# Patient Record
Sex: FEMALE | Race: WHITE | NOT HISPANIC OR LATINO | Employment: OTHER | URBAN - METROPOLITAN AREA
[De-identification: names, ages, dates, MRNs, and addresses within clinical notes are randomized per-mention and may not be internally consistent; named-entity substitution may affect disease eponyms.]

---

## 2023-11-27 ENCOUNTER — APPOINTMENT (OUTPATIENT)
Dept: GENERAL RADIOLOGY | Facility: HOSPITAL | Age: 83
End: 2023-11-27
Payer: MEDICARE

## 2023-11-27 ENCOUNTER — HOSPITAL ENCOUNTER (EMERGENCY)
Facility: HOSPITAL | Age: 83
Discharge: HOME | End: 2023-11-27
Attending: EMERGENCY MEDICINE | Admitting: EMERGENCY MEDICINE
Payer: MEDICARE

## 2023-11-27 VITALS
RESPIRATION RATE: 18 BRPM | OXYGEN SATURATION: 96 % | BODY MASS INDEX: 24.24 KG/M2 | TEMPERATURE: 98 F | HEIGHT: 64 IN | SYSTOLIC BLOOD PRESSURE: 133 MMHG | HEART RATE: 75 BPM | DIASTOLIC BLOOD PRESSURE: 70 MMHG | WEIGHT: 142 LBS

## 2023-11-27 DIAGNOSIS — R19.7 DIARRHEA, UNSPECIFIED TYPE: Primary | ICD-10-CM

## 2023-11-27 LAB
ALBUMIN SERPL-MCNC: 4.2 G/DL (ref 3.5–5.2)
ALBUMIN/GLOB SERPL: 1.8 G/DL
ALP SERPL-CCNC: 75 U/L (ref 39–117)
ALT SERPL W P-5'-P-CCNC: 16 U/L (ref 1–33)
ANION GAP SERPL CALCULATED.3IONS-SCNC: 10 MMOL/L (ref 5–15)
AST SERPL-CCNC: 18 U/L (ref 1–32)
BASOPHILS # BLD AUTO: 0.02 10*3/MM3 (ref 0–0.2)
BASOPHILS NFR BLD AUTO: 0.3 % (ref 0–1.5)
BILIRUB SERPL-MCNC: 0.5 MG/DL (ref 0–1.2)
BILIRUB UR QL STRIP: NEGATIVE
BUN SERPL-MCNC: 13 MG/DL (ref 8–23)
BUN/CREAT SERPL: 19.4 (ref 7–25)
CALCIUM SPEC-SCNC: 9.1 MG/DL (ref 8.6–10.5)
CHLORIDE SERPL-SCNC: 104 MMOL/L (ref 98–107)
CLARITY UR: CLEAR
CO2 SERPL-SCNC: 26 MMOL/L (ref 22–29)
COLOR UR: YELLOW
CREAT SERPL-MCNC: 0.67 MG/DL (ref 0.57–1)
DEPRECATED RDW RBC AUTO: 46.1 FL (ref 37–54)
EGFRCR SERPLBLD CKD-EPI 2021: 86.8 ML/MIN/1.73
EOSINOPHIL # BLD AUTO: 0.44 10*3/MM3 (ref 0–0.4)
EOSINOPHIL NFR BLD AUTO: 7.3 % (ref 0.3–6.2)
ERYTHROCYTE [DISTWIDTH] IN BLOOD BY AUTOMATED COUNT: 13.9 % (ref 12.3–15.4)
FERRITIN SERPL-MCNC: 159 NG/ML (ref 13–150)
GLOBULIN UR ELPH-MCNC: 2.4 GM/DL
GLUCOSE SERPL-MCNC: 102 MG/DL (ref 65–99)
GLUCOSE UR STRIP-MCNC: NEGATIVE MG/DL
HCT VFR BLD AUTO: 42.8 % (ref 34–46.6)
HGB BLD-MCNC: 13.4 G/DL (ref 12–15.9)
HGB UR QL STRIP.AUTO: NEGATIVE
IMM GRANULOCYTES # BLD AUTO: 0 10*3/MM3 (ref 0–0.05)
IMM GRANULOCYTES NFR BLD AUTO: 0 % (ref 0–0.5)
IRON 24H UR-MRATE: 98 MCG/DL (ref 37–145)
IRON SATN MFR SERPL: 23 % (ref 20–50)
KETONES UR QL STRIP: NEGATIVE
LEUKOCYTE ESTERASE UR QL STRIP.AUTO: NEGATIVE
LIPASE SERPL-CCNC: 33 U/L (ref 13–60)
LYMPHOCYTES # BLD AUTO: 1.1 10*3/MM3 (ref 0.7–3.1)
LYMPHOCYTES NFR BLD AUTO: 18.2 % (ref 19.6–45.3)
MAGNESIUM SERPL-MCNC: 2.2 MG/DL (ref 1.6–2.4)
MCH RBC QN AUTO: 27.9 PG (ref 26.6–33)
MCHC RBC AUTO-ENTMCNC: 31.3 G/DL (ref 31.5–35.7)
MCV RBC AUTO: 89.2 FL (ref 79–97)
MONOCYTES # BLD AUTO: 0.51 10*3/MM3 (ref 0.1–0.9)
MONOCYTES NFR BLD AUTO: 8.4 % (ref 5–12)
NEUTROPHILS NFR BLD AUTO: 3.99 10*3/MM3 (ref 1.7–7)
NEUTROPHILS NFR BLD AUTO: 65.8 % (ref 42.7–76)
NITRITE UR QL STRIP: NEGATIVE
PH UR STRIP.AUTO: 6.5 [PH] (ref 5–8)
PLATELET # BLD AUTO: 184 10*3/MM3 (ref 140–450)
PMV BLD AUTO: 12.3 FL (ref 6–12)
POTASSIUM SERPL-SCNC: 3.9 MMOL/L (ref 3.5–5.2)
PROT SERPL-MCNC: 6.6 G/DL (ref 6–8.5)
PROT UR QL STRIP: NEGATIVE
RBC # BLD AUTO: 4.8 10*6/MM3 (ref 3.77–5.28)
SODIUM SERPL-SCNC: 140 MMOL/L (ref 136–145)
SP GR UR STRIP: 1.01 (ref 1–1.03)
TIBC SERPL-MCNC: 434 MCG/DL (ref 298–536)
TRANSFERRIN SERPL-MCNC: 291 MG/DL (ref 200–360)
UROBILINOGEN UR QL STRIP: NORMAL
WBC NRBC COR # BLD AUTO: 6.06 10*3/MM3 (ref 3.4–10.8)

## 2023-11-27 PROCEDURE — 99283 EMERGENCY DEPT VISIT LOW MDM: CPT

## 2023-11-27 PROCEDURE — 83690 ASSAY OF LIPASE: CPT | Performed by: EMERGENCY MEDICINE

## 2023-11-27 PROCEDURE — 81003 URINALYSIS AUTO W/O SCOPE: CPT | Performed by: EMERGENCY MEDICINE

## 2023-11-27 PROCEDURE — 83540 ASSAY OF IRON: CPT | Performed by: EMERGENCY MEDICINE

## 2023-11-27 PROCEDURE — 84466 ASSAY OF TRANSFERRIN: CPT | Performed by: EMERGENCY MEDICINE

## 2023-11-27 PROCEDURE — 80053 COMPREHEN METABOLIC PANEL: CPT | Performed by: EMERGENCY MEDICINE

## 2023-11-27 PROCEDURE — 85025 COMPLETE CBC W/AUTO DIFF WBC: CPT | Performed by: EMERGENCY MEDICINE

## 2023-11-27 PROCEDURE — 83735 ASSAY OF MAGNESIUM: CPT | Performed by: EMERGENCY MEDICINE

## 2023-11-27 PROCEDURE — 36415 COLL VENOUS BLD VENIPUNCTURE: CPT

## 2023-11-27 PROCEDURE — 82728 ASSAY OF FERRITIN: CPT | Performed by: EMERGENCY MEDICINE

## 2023-11-27 PROCEDURE — 74022 RADEX COMPL AQT ABD SERIES: CPT

## 2023-11-27 RX ORDER — SODIUM CHLORIDE 0.9 % (FLUSH) 0.9 %
10 SYRINGE (ML) INJECTION AS NEEDED
Status: DISCONTINUED | OUTPATIENT
Start: 2023-11-27 | End: 2023-11-27 | Stop reason: HOSPADM

## 2023-11-27 RX ORDER — DICYCLOMINE HCL 20 MG
20 TABLET ORAL EVERY 6 HOURS PRN
Qty: 30 TABLET | Refills: 0 | Status: SHIPPED | OUTPATIENT
Start: 2023-11-27

## 2023-11-27 NOTE — FSED PROVIDER NOTE
Subjective   History of Present Illness  Patient is a 83-year-old female.  She recently moved from [place] to [place].  She presents with a several month history of recurrent diarrhea.  She states it does not appear to be related to food intake.  She denies any known bloody or tarry stools.  She denies any associated abdominal pain with this.  No nausea vomiting.  No weight loss.  No history of abdominal surgeries.  No dysuria or hematuria.      Review of Systems  Constitutional: No fevers, chills, sweats unless otherwise documented in HPI  Eyes: No recent visual problems, eye discharge, eye pain, redness unless otherwise documented in HPI  HEENT: No ear pain, nasal congestion, sore throat, voice changes unless otherwise documented in HPI  Respiratory: No shortness of breath, cough, pain on breathing, sputum production unless otherwise documented in HPI  Cardiovascular: No chest pain, palpitations, syncope, orthopnea unless otherwise documented in HPI  Gastrointestinal: No nausea, vomiting, diarrhea, constipation unless otherwise documented in HPI  Genitourinary: No hematuria, dysuria, incontinence unless otherwise documented in HPI  Endocrine: Negative for excessive thirst, excessive hunger, excessive urination, heat or cold intolerance unless otherwise documented in HPI  Musculoskeletal: No back pain, neck pain, joint pain, muscle pain, decreased range of motion unless otherwise documented in HPI  Integumentary: No rash, pruritus, abrasion, lesions unless otherwise documented in HPI  Neurologic: No weakness, numbness, frequent headaches, tremors unless otherwise documented in HPI  Psychiatric: No anxiety, depression, mood changes, hallucinations unless otherwise documented in HPI        History reviewed. No pertinent past medical history.    Allergies   Allergen Reactions    Iodine Hives    Succinylcholine Unknown - High Severity       Past Surgical History:   Procedure Laterality Date    HYSTERECTOMY  1976        Family History   Problem Relation Age of Onset    Ovarian cancer Neg Hx     Breast cancer Neg Hx        Social History     Socioeconomic History    Marital status:            Objective   Physical Exam  Vital signs: Reviewed in nurses notes    General: Patient is awake alert no acute distress    HEENT: Normocephalic atraumatic nasopharynx clear face clear moist    Neck:   Supple without lymphadenopathy    Respiratory:   Nonlabored respirations.  Clear to auscultation bilaterally.  Equal breath sounds bilaterally.  No wheezes or stridor noted.    Cardiovascular: Regular rate and rhythm.  No murmur.  Equal pulses in bilateral lower extremities without edema.    Abdomen: Soft, nontender to palpation.  Normal bowel sounds noted.  No masses noted.    Skin:   Warm and dry.  No rashes noted    Neurological examination: Patient is awake alert oriented x4.  Speech is normal.  No facial palsy.  No focal motor or sensory deficits.    Procedures           ED Course           Lab Results (last 72 hours)       Procedure Component Value Units Date/Time    Lipase [[id number]]  (Normal) Collected: 11/27/23 1119    Specimen: Blood Updated: 11/27/23 1144     Lipase 33 U/L     Comprehensive Metabolic Panel [[id number]]  (Abnormal) Collected: 11/27/23 1119    Specimen: Blood Updated: 11/27/23 1144     Glucose 102 mg/dL      BUN 13 mg/dL      Creatinine 0.67 mg/dL      Sodium 140 mmol/L      Potassium 3.9 mmol/L      Chloride 104 mmol/L      CO2 26.0 mmol/L      Calcium 9.1 mg/dL      Total Protein 6.6 g/dL      Albumin 4.2 g/dL      ALT (SGPT) 16 U/L      AST (SGOT) 18 U/L      Alkaline Phosphatase 75 U/L      Total Bilirubin 0.5 mg/dL      Globulin 2.4 gm/dL      A/G Ratio 1.8 g/dL      BUN/Creatinine Ratio 19.4     Anion Gap 10.0 mmol/L      eGFR 86.8 mL/min/1.73     Narrative:      GFR Normal >60  Chronic Kidney Disease <60  Kidney Failure <15    The GFR formula is only valid for adults with stable renal function between ages  18 and 70.    Magnesium [[id number]]  (Normal) Collected: 11/27/23 1119    Specimen: Blood Updated: 11/27/23 1144     Magnesium 2.2 mg/dL     CBC & Differential [[id number]]  (Abnormal) Collected: 11/27/23 1119    Specimen: Blood Updated: 11/27/23 1131    Narrative:      The following orders were created for panel order CBC & Differential.  Procedure                               Abnormality         Status                     ---------                               -----------         ------                     CBC Auto Differential[[id number]]         Abnormal            Final result                 Please view results for these tests on the individual orders.    Urinalysis With Culture If Indicated - Urine, Clean Catch [[id number]]  (Normal) Collected: 11/27/23 1119    Specimen: Urine, Clean Catch Updated: 11/27/23 1122     Color, UA Yellow     Appearance, UA Clear     pH, UA 6.5     Specific Gravity, UA 1.010     Glucose, UA Negative     Ketones, UA Negative     Bilirubin, UA Negative     Blood, UA Negative     Protein, UA Negative     Leuk Esterase, UA Negative     Nitrite, UA Negative     Urobilinogen, UA 0.2 E.U./dL    Narrative:      In absence of clinical symptoms, the presence of pyuria, bacteria, and/or nitrites on the urinalysis result does not correlate with infection.  Urine microscopic not indicated.    Iron Profile [[id number]] Collected: 11/27/23 1119    Specimen: Blood Updated: 11/27/23 1119    Ferritin [[id number]] Collected: 11/27/23 1119    Specimen: Blood Updated: 11/27/23 1119    CBC Auto Differential [[id number]]  (Abnormal) Collected: 11/27/23 1119    Specimen: Blood Updated: 11/27/23 1131     WBC 6.06 10*3/mm3      RBC 4.80 10*6/mm3      Hemoglobin 13.4 g/dL      Hematocrit 42.8 %      MCV 89.2 fL      MCH 27.9 pg      MCHC 31.3 g/dL      RDW 13.9 %      RDW-SD 46.1 fl      MPV 12.3 fL      Platelets 184 10*3/mm3      Neutrophil % 65.8 %      Lymphocyte % 18.2 %      Monocyte % 8.4 %      Eosinophil % 7.3  %      Basophil % 0.3 %      Immature Grans % 0.0 %      Neutrophils, Absolute 3.99 10*3/mm3      Lymphocytes, Absolute 1.10 10*3/mm3      Monocytes, Absolute 0.51 10*3/mm3      Eosinophils, Absolute 0.44 10*3/mm3      Basophils, Absolute 0.02 10*3/mm3      Immature Grans, Absolute 0.00 10*3/mm3                                XR Abdomen 2+ VW with Chest 1 VW    Result Date: 11/27/2023  ACUTE ABDOMEN SERIES: 2 VIEWS OF THE ABDOMEN AND 1 VIEW OF THE CHEST  HISTORY: Recurrent diarrhea for several months.  COMPARISON: None  FINDINGS: FLAT AND UPRIGHT ABDOMEN: There are no air-filled, dilated bowel loops to suggest obstruction. Cholecystectomy clips are present. There is a mild levoscoliotic curvature lumbar spine. Left total hip arthroplasty is present. Bones appear osteopenic.  AP CHEST: Heart size upper normal. Aortic vascular calcifications are present. Lungs appear clear and there is no evidence for pulmonary edema or pleural effusion.      Nonobstructive bowel gas pattern. Previous cholecystectomy and left total hip arthroplasty.  This report was finalized on 11/27/2023 11:55 AM by Dr. Malick Jimenez M.D on Workstation: [id number]            Differential diagnosis includes electrode abnormality, IBS, inflammatory bowel disease  Medical Decision Making  Problems Addressed:  Diarrhea, unspecified type: complicated acute illness or injury    Amount and/or Complexity of Data Reviewed  Labs: ordered.  Radiology: ordered.    Risk  Prescription drug management.    The x-rays were independently reviewed as well as review of the radiologist interpretation  Upon discharge patient noted to be very stable.  Appropriate treatment plan and return precautions discussed    Final diagnoses:   Diarrhea, unspecified type       ED Disposition  ED Disposition       ED Disposition   Discharge    Condition   Stable    Comment   --               Vasyl Fernandez MD  [address]  [id number] [phone]          Vasyl Fernandez MD  [address]  [place] [id number] [phone]               Medication List        New Prescriptions      dicyclomine 20 MG tablet  Commonly known as: BENTYL  Take 1 tablet by mouth Every 6 (Six) Hours As Needed (abdominal pain and /or diarrhea).               Where to Get Your Medications        These medications were sent to [facility]/pharmacy #[id number] - [place], [place] - [address] AT [facility] - [phone]  - [phone]   [address], [facility] [address]      Phone: [phone]   dicyclomine 20 MG tablet

## 2023-11-27 NOTE — DISCHARGE INSTRUCTIONS
Today your lab work is reassuring.  Your abdominal x-ray does not show any obstructive process.    I did send in a medication that is very safe to be used to slow your bowel motility.  Use as directed    I did send an internal referral to our gastroenterologist on-call.  I likewise did give you his name and number if you do not hear from the office within the next 48 hours.    I also am going to give you the number to the [facility] connection line which can help set up the primary care if you do not have any luck with the referral sheet given to you today    I will be here this Thursday night from 7 PM all night at [phone] if you discover that you need a refill on your antihypertensive medication    Return for any difficulties

## 2024-01-03 ENCOUNTER — APPOINTMENT (OUTPATIENT)
Dept: GENERAL RADIOLOGY | Facility: HOSPITAL | Age: 84
End: 2024-01-03
Payer: MEDICARE

## 2024-01-03 ENCOUNTER — HOSPITAL ENCOUNTER (OUTPATIENT)
Facility: HOSPITAL | Age: 84
Discharge: HOME | End: 2024-01-03
Attending: EMERGENCY MEDICINE | Admitting: EMERGENCY MEDICINE
Payer: MEDICARE

## 2024-01-03 VITALS
OXYGEN SATURATION: 96 % | DIASTOLIC BLOOD PRESSURE: 58 MMHG | TEMPERATURE: 98.4 F | SYSTOLIC BLOOD PRESSURE: 124 MMHG | WEIGHT: 144 LBS | HEIGHT: 64 IN | HEART RATE: 75 BPM | RESPIRATION RATE: 16 BRPM | BODY MASS INDEX: 24.59 KG/M2

## 2024-01-03 DIAGNOSIS — J06.9 UPPER RESPIRATORY TRACT INFECTION, UNSPECIFIED TYPE: Primary | ICD-10-CM

## 2024-01-03 LAB
FLUAV SUBTYP SPEC NAA+PROBE: NOT DETECTED
FLUBV RNA ISLT QL NAA+PROBE: NOT DETECTED
RSV RNA NPH QL NAA+NON-PROBE: NOT DETECTED
SARS-COV-2 RNA RESP QL NAA+PROBE: NOT DETECTED

## 2024-01-03 PROCEDURE — 87636 SARSCOV2 & INF A&B AMP PRB: CPT

## 2024-01-03 PROCEDURE — G0463 HOSPITAL OUTPT CLINIC VISIT: HCPCS | Performed by: NURSE PRACTITIONER

## 2024-01-03 PROCEDURE — 71045 X-RAY EXAM CHEST 1 VIEW: CPT

## 2024-01-03 PROCEDURE — 87634 RSV DNA/RNA AMP PROBE: CPT

## 2024-01-03 RX ORDER — BROMPHENIRAMINE MALEATE, PSEUDOEPHEDRINE HYDROCHLORIDE, AND DEXTROMETHORPHAN HYDROBROMIDE 2; 30; 10 MG/5ML; MG/5ML; MG/5ML
5 SYRUP ORAL 4 TIMES DAILY PRN
Qty: 120 ML | Refills: 0 | Status: SHIPPED | OUTPATIENT
Start: 2024-01-03

## 2024-01-03 NOTE — FSED PROVIDER NOTE
Subjective   History of Present Illness  Patient is an 83-year-old female who presents complaining of cough that started over the weekend.  Reports she is also been congestion and has had fatigue and chills.  She denies chest pain, shortness of breath.  Denies any vomiting or diarrhea.      Review of Systems   Constitutional:  Positive for chills and fatigue.   HENT:  Positive for congestion.    Respiratory:  Positive for cough.    All other systems reviewed and are negative.      History reviewed. No pertinent past medical history.    Allergies   Allergen Reactions    Iodine Hives    Succinylcholine Unknown - High Severity       Past Surgical History:   Procedure Laterality Date    HYSTERECTOMY  1976       Family History   Problem Relation Age of Onset    Ovarian cancer Neg Hx     Breast cancer Neg Hx        Social History     Socioeconomic History    Marital status:            Objective   Physical Exam  Vitals and nursing note reviewed.   Constitutional:       Appearance: Normal appearance.   HENT:      Head: Normocephalic and atraumatic.      Nose: Congestion present.   Pulmonary:      Effort: Pulmonary effort is normal.      Breath sounds: Normal breath sounds.   Musculoskeletal:      Cervical back: Normal range of motion and neck supple.   Skin:     General: Skin is warm and dry.      Capillary Refill: Capillary refill takes less than 2 seconds.   Neurological:      General: No focal deficit present.      Mental Status: She is alert and oriented to person, place, and time.         Procedures           ED Course                                           Medical Decision Making  Negative chest x-ray, negative flu COVID.  Likely viral URI.  Patient is nontoxic and is follow-up with her primary care, she was given strict return precautions.    Problems Addressed:  Upper respiratory tract infection, unspecified type: complicated acute illness or injury    Amount and/or Complexity of Data Reviewed  Labs:  ordered.  Radiology: ordered.    Risk  Prescription drug management.        Final diagnoses:   Upper respiratory tract infection, unspecified type       ED Disposition  ED Disposition       ED Disposition   Discharge    Condition   Stable    Comment   --               Provider, No Known  [place] [address]  [phone]    Call   If symptoms worsen         Medication List        New Prescriptions      brompheniramine-pseudoephedrine-DM 30-2-10 MG/5ML syrup  Take 5 mL by mouth 4 (Four) Times a Day As Needed for Cough or Congestion.               Where to Get Your Medications        These medications were sent to [facility]/pharmacy #[id number] - [place], [place] - [address] AT [facility] - [phone]  - [phone] fx [address], [facility] [address]      Phone: [phone]   brompheniramine-pseudoephedrine-DM 30-2-10 MG/5ML syrup

## 2024-02-13 ENCOUNTER — OFFICE VISIT (OUTPATIENT)
Dept: FAMILY MEDICINE CLINIC | Facility: CLINIC | Age: 84
End: 2024-02-13
Payer: MEDICARE

## 2024-02-13 VITALS
WEIGHT: 152.5 LBS | BODY MASS INDEX: 26.03 KG/M2 | RESPIRATION RATE: 16 BRPM | OXYGEN SATURATION: 95 % | HEART RATE: 64 BPM | SYSTOLIC BLOOD PRESSURE: 126 MMHG | DIASTOLIC BLOOD PRESSURE: 52 MMHG | TEMPERATURE: 97.6 F | HEIGHT: 64 IN

## 2024-02-13 DIAGNOSIS — I10 PRIMARY HYPERTENSION: Primary | ICD-10-CM

## 2024-02-13 PROBLEM — J40 BRONCHITIS: Status: ACTIVE | Noted: 2024-02-13

## 2024-02-13 PROBLEM — M19.90 ARTHRITIS: Status: ACTIVE | Noted: 2024-02-13

## 2024-02-13 PROCEDURE — 3074F SYST BP LT 130 MM HG: CPT | Performed by: INTERNAL MEDICINE

## 2024-02-13 PROCEDURE — 1159F MED LIST DOCD IN RCRD: CPT | Performed by: INTERNAL MEDICINE

## 2024-02-13 PROCEDURE — 1160F RVW MEDS BY RX/DR IN RCRD: CPT | Performed by: INTERNAL MEDICINE

## 2024-02-13 PROCEDURE — 99203 OFFICE O/P NEW LOW 30 MIN: CPT | Performed by: INTERNAL MEDICINE

## 2024-02-13 PROCEDURE — 3078F DIAST BP <80 MM HG: CPT | Performed by: INTERNAL MEDICINE

## 2024-02-13 RX ORDER — VITAMIN E 268 MG
800 CAPSULE ORAL DAILY
COMMUNITY

## 2024-02-13 RX ORDER — BISOPROLOL FUMARATE 10 MG/1
10 TABLET, FILM COATED ORAL DAILY
Qty: 30 TABLET | Refills: 2 | Status: SHIPPED | OUTPATIENT
Start: 2024-02-13

## 2024-02-13 RX ORDER — ASPIRIN 81 MG/1
81 TABLET ORAL DAILY
COMMUNITY

## 2024-02-13 RX ORDER — BISOPROLOL FUMARATE 10 MG/1
1 TABLET, FILM COATED ORAL DAILY
COMMUNITY
End: 2024-02-13 | Stop reason: SDUPTHER

## 2024-02-14 ENCOUNTER — PATIENT ROUNDING (BHMG ONLY) (OUTPATIENT)
Dept: FAMILY MEDICINE CLINIC | Facility: CLINIC | Age: 84
End: 2024-02-14
Payer: MEDICARE

## 2024-02-16 ENCOUNTER — OFFICE VISIT (OUTPATIENT)
Dept: GASTROENTEROLOGY | Facility: CLINIC | Age: 84
End: 2024-02-16
Payer: MEDICARE

## 2024-02-16 VITALS
WEIGHT: 153.6 LBS | DIASTOLIC BLOOD PRESSURE: 74 MMHG | BODY MASS INDEX: 26.22 KG/M2 | HEIGHT: 64 IN | OXYGEN SATURATION: 96 % | SYSTOLIC BLOOD PRESSURE: 129 MMHG | HEART RATE: 75 BPM

## 2024-02-16 DIAGNOSIS — R68.89 OTHER GENERAL SYMPTOMS AND SIGNS: ICD-10-CM

## 2024-02-16 DIAGNOSIS — K52.9 CHRONIC DIARRHEA: ICD-10-CM

## 2024-02-16 DIAGNOSIS — R10.31 RIGHT LOWER QUADRANT PAIN: Primary | ICD-10-CM

## 2024-02-16 RX ORDER — L.RHAMNOSUS/B.ANIMALIS(LACTIS) 3B CELL
1 CAPSULE ORAL DAILY
Start: 2024-02-16

## 2024-02-19 DIAGNOSIS — R10.31 RIGHT LOWER QUADRANT PAIN: Primary | ICD-10-CM

## 2024-02-19 DIAGNOSIS — K52.9 CHRONIC DIARRHEA: ICD-10-CM

## 2024-02-19 RX ORDER — PREDNISONE 1 MG/1
50 TABLET ORAL
OUTPATIENT
Start: 2024-02-19 | End: 2024-02-19

## 2024-02-19 RX ORDER — PREDNISONE 50 MG/1
50 TABLET ORAL TAKE AS DIRECTED
Qty: 2 TABLET | Refills: 0 | Status: SHIPPED | OUTPATIENT
Start: 2024-02-19

## 2024-02-19 RX ORDER — DIPHENHYDRAMINE HYDROCHLORIDE 50 MG/ML
50 INJECTION INTRAMUSCULAR; INTRAVENOUS
OUTPATIENT
Start: 2024-02-19 | End: 2024-02-19

## 2024-02-19 RX ORDER — DIPHENHYDRAMINE HCL 25 MG
50 TABLET ORAL
OUTPATIENT
Start: 2024-02-19 | End: 2024-02-19

## 2024-03-07 ENCOUNTER — LAB (OUTPATIENT)
Facility: HOSPITAL | Age: 84
End: 2024-03-07
Payer: MEDICARE

## 2024-03-07 ENCOUNTER — HOSPITAL ENCOUNTER (OUTPATIENT)
Facility: HOSPITAL | Age: 84
Discharge: HOME | End: 2024-03-07
Payer: MEDICARE

## 2024-03-07 DIAGNOSIS — K52.9 CHRONIC DIARRHEA: ICD-10-CM

## 2024-03-07 DIAGNOSIS — R10.31 RIGHT LOWER QUADRANT PAIN: ICD-10-CM

## 2024-03-07 LAB
CRP SERPL-MCNC: <0.3 MG/DL (ref 0–0.5)
ERYTHROCYTE [SEDIMENTATION RATE] IN BLOOD: 13 MM/HR (ref 0–30)
TSH SERPL DL<=0.05 MIU/L-ACNC: 0.58 UIU/ML (ref 0.27–4.2)

## 2024-03-07 PROCEDURE — 84443 ASSAY THYROID STIM HORMONE: CPT | Performed by: NURSE PRACTITIONER

## 2024-03-07 PROCEDURE — 86140 C-REACTIVE PROTEIN: CPT | Performed by: NURSE PRACTITIONER

## 2024-03-07 PROCEDURE — 0 DIATRIZOATE MEGLUMINE & SODIUM PER 1 ML: Performed by: NURSE PRACTITIONER

## 2024-03-07 PROCEDURE — 74177 CT ABD & PELVIS W/CONTRAST: CPT

## 2024-03-07 PROCEDURE — 85652 RBC SED RATE AUTOMATED: CPT | Performed by: NURSE PRACTITIONER

## 2024-03-07 PROCEDURE — 25510000001 IOPAMIDOL 61 % SOLUTION: Performed by: NURSE PRACTITIONER

## 2024-03-07 RX ADMIN — IOPAMIDOL 85 ML: 612 INJECTION, SOLUTION INTRAVENOUS at 14:21

## 2024-03-07 RX ADMIN — DIATRIZOATE MEGLUMINE AND DIATRIZOATE SODIUM 30 ML: 600; 100 SOLUTION ORAL; RECTAL at 12:23

## 2024-03-11 ENCOUNTER — TELEPHONE (OUTPATIENT)
Dept: GASTROENTEROLOGY | Facility: CLINIC | Age: 84
End: 2024-03-11
Payer: MEDICARE

## 2024-03-11 NOTE — TELEPHONE ENCOUNTER
Pt reviewed results via [employer].     Sent pt [employer] msg advising of results and recommendations. Advised to call if any questions.

## 2024-03-11 NOTE — TELEPHONE ENCOUNTER
----- Message from MARLENE Price sent at 3/8/2024  8:36 AM EST -----  Please notify the patient lab work is normal await imaging.

## 2024-03-15 ENCOUNTER — TELEPHONE (OUTPATIENT)
Dept: GASTROENTEROLOGY | Facility: CLINIC | Age: 84
End: 2024-03-15
Payer: MEDICARE

## 2024-03-15 NOTE — TELEPHONE ENCOUNTER
Call to patient per Aggie's results and recommendations.   Patient verbalized understanding     Patient states the probiotics have been beneficial. Patient states the pain on the right side. It happens randomly and she never knows when it is coming      ----- Message from MARLENE Price sent at 3/15/2024 11:38 AM EDT -----  Please inform the patient CT shows diverticulosis without evidence of diverticulitis.  Small areas on the liver suspected to be benign hemangiomas which are cluster of blood vessels.  She has a moderate amount of atherosclerotic changes on her abdomi  nal aorta.  She can follow-up with her primary care provider for that.  Has she found [employer] colon health probiotics beneficial?

## 2024-04-23 LAB
CRP SERPL-MCNC: <0.3 MG/DL (ref 0–0.5)
ERYTHROCYTE [SEDIMENTATION RATE] IN BLOOD BY WESTERGREN METHOD: 4 MM/HR (ref 0–30)
TSH SERPL DL<=0.005 MIU/L-ACNC: 1.41 UIU/ML (ref 0.27–4.2)

## 2024-04-25 ENCOUNTER — TELEPHONE (OUTPATIENT)
Dept: GASTROENTEROLOGY | Facility: CLINIC | Age: 84
End: 2024-04-25
Payer: MEDICARE

## 2024-04-25 NOTE — TELEPHONE ENCOUNTER
----- Message from MARLENE Price sent at 4/24/2024 12:16 PM EDT -----  Please inform the patient lab work is normal.

## 2024-04-25 NOTE — TELEPHONE ENCOUNTER
Pt reviewed results via [facility].     Sent pt [facility] msg advising of results and recommendations. Advised to call if any questions.

## 2024-04-30 ENCOUNTER — OFFICE VISIT (OUTPATIENT)
Dept: FAMILY MEDICINE CLINIC | Facility: CLINIC | Age: 84
End: 2024-04-30
Payer: MEDICARE

## 2024-04-30 VITALS
RESPIRATION RATE: 18 BRPM | OXYGEN SATURATION: 96 % | BODY MASS INDEX: 26.34 KG/M2 | WEIGHT: 154.3 LBS | HEIGHT: 64 IN | HEART RATE: 69 BPM | DIASTOLIC BLOOD PRESSURE: 66 MMHG | TEMPERATURE: 97.1 F | SYSTOLIC BLOOD PRESSURE: 122 MMHG

## 2024-04-30 DIAGNOSIS — N39.41 URGE INCONTINENCE: Primary | ICD-10-CM

## 2024-04-30 DIAGNOSIS — Z00.00 MEDICARE ANNUAL WELLNESS VISIT, SUBSEQUENT: ICD-10-CM

## 2024-04-30 DIAGNOSIS — G25.2 RESTING TREMOR: ICD-10-CM

## 2024-04-30 NOTE — PROGRESS NOTES
The ABCs of the Annual Wellness Visit  Subsequent Medicare Wellness Visit    Subjective    Yareli Fowler is a 83 y.o. female who presents for a Subsequent Medicare Wellness Visit.    The following portions of the patient's history were reviewed and   updated as appropriate: allergies, current medications, past family history, past medical history, past social history, past surgical history, and problem list.    Compared to one year ago, the patient feels her physical   health is better.    Compared to one year ago, the patient feels her mental   health is the same.    Recent Hospitalizations:  This patient has had a [facility] admission record on file within the last 365 days.    Current Medical Providers:  Patient Care Team:  Berenice Juarez MD as PCP - General (Internal Medicine)    Outpatient Medications Prior to Visit   Medication Sig Dispense Refill    aspirin 81 MG EC tablet Take 1 tablet by mouth Daily.      bisoprolol (ZEBeta) 10 MG tablet Take 1 tablet by mouth Daily. 30 tablet 2    predniSONE (DELTASONE) 50 MG tablet Take 1 tablet by mouth Take As Directed for 2 doses. Take 1 tablet (50mg) 13 Hours & 7 Hours Prior to Exam 2 tablet 0    Probiotic Product ([employer]) capsule capsule Take 1 capsule by mouth Daily.      vitamin D3 125 MCG (5000 UT) capsule capsule Take 1 capsule by mouth Daily.      VITAMIN E 400 UNIT capsule Take 2 capsules by mouth Daily.       No facility-administered medications prior to visit.       No opioid medication identified on active medication list. I have reviewed chart for other potential  high risk medication/s and harmful drug interactions in the elderly.        Aspirin is on active medication list. Aspirin use is indicated based on review of current medical condition/s. Pros and cons of this therapy have been discussed today. Benefits of this medication outweigh potential harm.  Patient has been encouraged to continue taking this medication.   ".      Patient Active Problem List   Diagnosis    Arthritis    Bronchitis    Hypertension     Advance Care Planning   Advance Care Planning     Advance Directive is not on file.  ACP discussion was held with the patient during this visit. Patient has an advance directive (not in EMR), copy requested.     Objective    Vitals:    24 1101   BP: 122/66   BP Location: Right arm   Patient Position: Sitting   Cuff Size: Adult   Pulse: 69   Resp: 18   Temp: 97.1 °F (36.2 °C)   TempSrc: Oral   SpO2: 96%   Weight: 70 kg (154 lb 4.8 oz)   Height: 162.6 cm (64.02\")     Estimated body mass index is 26.47 kg/m² as calculated from the following:    Height as of this encounter: 162.6 cm (64.02\").    Weight as of this encounter: 70 kg (154 lb 4.8 oz).    BMI is >= 25 and <30. (Overweight) The following options were offered after discussion;: exercise counseling/recommendations and nutrition counseling/recommendations      Does the patient have evidence of cognitive impairment? Yes          HEALTH RISK ASSESSMENT    Smoking Status:  Social History     Tobacco Use   Smoking Status Former    Current packs/day: 0.00    Average packs/day: 0.3 packs/day for 1 year (0.3 ttl pk-yrs)    Types: Cigarettes    Start date:     Quit date:     Years since quittin.3    Passive exposure: Past   Smokeless Tobacco Never     Alcohol Consumption:  Social History     Substance and Sexual Activity   Alcohol Use Yes    Comment: A glass of wine monthly     Fall Risk Screen:    SPEEDYADI Fall Risk Assessment was completed, and patient is at LOW risk for falls.Assessment completed on:2024    Depression Screenin/30/2024    10:59 AM   PHQ-2/PHQ-9 Depression Screening   Little Interest or Pleasure in Doing Things 0-->not at all   Feeling Down, Depressed or Hopeless 0-->not at all   PHQ-9: Brief Depression Severity Measure Score 0       Health Habits and Functional and Cognitive Screenin/30/2024    10:58 AM   Functional & " Cognitive Status   Do you have difficulty preparing food and eating? No   Do you have difficulty bathing yourself, getting dressed or grooming yourself? No   Do you have difficulty using the toilet? No   Do you have difficulty moving around from place to place? No   Do you have trouble with steps or getting out of a bed or a chair? No   Current Diet Well Balanced Diet   Dental Exam Not up to date   Eye Exam Not up to date   Exercise (times per week) 0 times per week   Current Exercises Include No Regular Exercise   Do you need help using the phone?  No   Are you deaf or do you have serious difficulty hearing?  No   Do you need help to go to places out of walking distance? No   Do you need help shopping? No   Do you need help preparing meals?  No   Do you need help with housework?  Yes   Do you need help with laundry? No   Do you need help taking your medications? No   Do you need help managing money? No   Do you ever drive or ride in a car without wearing a seat belt? No   Have you felt unusual stress, anger or loneliness in the last month? No   Who do you live with? Spouse   If you need help, do you have trouble finding someone available to you? No   Have you been bothered in the last four weeks by sexual problems? No   Do you have difficulty concentrating, remembering or making decisions? No       Age-appropriate Screening Schedule:  Refer to the list below for future screening recommendations based on patient's age, sex and/or medical conditions. Orders for these recommended tests are listed in the plan section. The patient has been provided with a written plan.    Health Maintenance   Topic Date Due    DXA SCAN  Never done    TDAP/TD VACCINES (1 - Tdap) Never done    ZOSTER VACCINE (1 of 2) Never done    RSV Vaccine - Adults (1 - 1-dose 60+ series) Never done    Pneumococcal Vaccine 65+ (1 of 1 - PCV) Never done    COVID-19 Vaccine (2 - 2023-24 season) 09/01/2023    INFLUENZA VACCINE  08/01/2024    ANNUAL  "WELLNESS VISIT  04/30/2025    BMI FOLLOWUP  04/30/2025                  CMS Preventative Services Quick Reference  Risk Factors Identified During Encounter  Urinary Incontinence: New medication begun   Dental Screening Recommended  Vision Screening Recommended  The above risks/problems have been discussed with the patient.  Pertinent information has been shared with the patient in the After Visit Summary.  An After Visit Summary and PPPS were made available to the patient.    Follow Up:   Next Medicare Wellness visit to be scheduled in 1 year.       Additional E&M Note during same encounter follows:  Patient has multiple medical problems which are significant and separately identifiable that require additional work above and beyond the Medicare Wellness Visit.      Chief Complaint  Medicare Wellness-subsequent    Subjective        HPI  Yareli Fowler is also being seen today for resting tremor and urge incontinence.  She reports that for the past couple of months she has been experiencing tremor in her right hand at rest occurring intermittently.  She reports family history of Parkinson's disease and Alzheimer's.  Denies memory loss, ataxia loss of balance.  Also reports urinary incontinence with urge to pee.         Objective   Vital Signs:  /66 (BP Location: Right arm, Patient Position: Sitting, Cuff Size: Adult)   Pulse 69   Temp 97.1 °F (36.2 °C) (Oral)   Resp 18   Ht 162.6 cm (64.02\")   Wt 70 kg (154 lb 4.8 oz)   SpO2 96%   BMI 26.47 kg/m²     Physical Exam  Constitutional:       Appearance: Normal appearance.   HENT:      Head: Normocephalic and atraumatic.   Cardiovascular:      Rate and Rhythm: Normal rate and regular rhythm.      Heart sounds: Normal heart sounds.   Pulmonary:      Breath sounds: Normal breath sounds.   Abdominal:      General: Bowel sounds are normal.      Palpations: Abdomen is soft.   Neurological:      Mental Status: She is alert and oriented to person, place, and time. "   Psychiatric:         Mood and Affect: Mood normal.          The following data was reviewed by: Berenice Juarez MD on 04/30/2024:  Common labs          11/27/2023    11:19   Common Labs   Glucose 102    BUN 13    Creatinine 0.67    Sodium 140    Potassium 3.9    Chloride 104    Calcium 9.1    Albumin 4.2    Total Bilirubin 0.5    Alkaline Phosphatase 75    AST (SGOT) 18    ALT (SGPT) 16    WBC 6.06    Hemoglobin 13.4    Hematocrit 42.8    Platelets 184                 Assessment and Plan   Diagnoses and all orders for this visit:    1. Urge incontinence (Primary)  -     Mirabegron ER (MYRBETRIQ) 25 MG tablet sustained-release 24 hour 24 hr tablet; Take 1 tablet by mouth Daily.  Dispense: 30 tablet; Refill: 0    2. Resting tremor  -     Ambulatory Referral to Neurology    3. Medicare annual wellness visit, subsequent  -     CBC Auto Differential  -     Comprehensive Metabolic Panel  -     Lipid Panel With / Chol / HDL Ratio  -     Hemoglobin A1c             Follow Up   No follow-ups on file.  Patient was given instructions and counseling regarding her condition or for health maintenance advice. Please see specific information pulled into the AVS if appropriate.

## 2024-05-08 LAB
ALBUMIN SERPL-MCNC: 4.7 G/DL (ref 3.5–5.2)
ALBUMIN/GLOB SERPL: 2.2 G/DL
ALP SERPL-CCNC: 64 U/L (ref 39–117)
ALT SERPL-CCNC: 13 U/L (ref 1–33)
AST SERPL-CCNC: 15 U/L (ref 1–32)
BASOPHILS # BLD AUTO: 0.04 10*3/MM3 (ref 0–0.2)
BASOPHILS NFR BLD AUTO: 0.7 % (ref 0–1.5)
BILIRUB SERPL-MCNC: 0.6 MG/DL (ref 0–1.2)
BUN SERPL-MCNC: 15 MG/DL (ref 8–23)
BUN/CREAT SERPL: 20.5 (ref 7–25)
CALCIUM SERPL-MCNC: 9.3 MG/DL (ref 8.6–10.5)
CHLORIDE SERPL-SCNC: 104 MMOL/L (ref 98–107)
CHOLEST SERPL-MCNC: 260 MG/DL (ref 0–200)
CHOLEST/HDLC SERPL: 4.33 {RATIO}
CO2 SERPL-SCNC: 26.3 MMOL/L (ref 22–29)
CREAT SERPL-MCNC: 0.73 MG/DL (ref 0.57–1)
EGFRCR SERPLBLD CKD-EPI 2021: 81.7 ML/MIN/1.73
EOSINOPHIL # BLD AUTO: 0.11 10*3/MM3 (ref 0–0.4)
EOSINOPHIL NFR BLD AUTO: 1.8 % (ref 0.3–6.2)
ERYTHROCYTE [DISTWIDTH] IN BLOOD BY AUTOMATED COUNT: 13 % (ref 12.3–15.4)
GLOBULIN SER CALC-MCNC: 2.1 GM/DL
GLUCOSE SERPL-MCNC: 95 MG/DL (ref 65–99)
HBA1C MFR BLD: 5.5 % (ref 4.8–5.6)
HCT VFR BLD AUTO: 43.4 % (ref 34–46.6)
HDLC SERPL-MCNC: 60 MG/DL (ref 40–60)
HGB BLD-MCNC: 14.1 G/DL (ref 12–15.9)
IMM GRANULOCYTES # BLD AUTO: 0.01 10*3/MM3 (ref 0–0.05)
IMM GRANULOCYTES NFR BLD AUTO: 0.2 % (ref 0–0.5)
LDLC SERPL CALC-MCNC: 177 MG/DL (ref 0–100)
LYMPHOCYTES # BLD AUTO: 1.06 10*3/MM3 (ref 0.7–3.1)
LYMPHOCYTES NFR BLD AUTO: 17.5 % (ref 19.6–45.3)
MCH RBC QN AUTO: 29.2 PG (ref 26.6–33)
MCHC RBC AUTO-ENTMCNC: 32.5 G/DL (ref 31.5–35.7)
MCV RBC AUTO: 89.9 FL (ref 79–97)
MONOCYTES # BLD AUTO: 0.51 10*3/MM3 (ref 0.1–0.9)
MONOCYTES NFR BLD AUTO: 8.4 % (ref 5–12)
NEUTROPHILS # BLD AUTO: 4.32 10*3/MM3 (ref 1.7–7)
NEUTROPHILS NFR BLD AUTO: 71.4 % (ref 42.7–76)
PLATELET # BLD AUTO: 175 10*3/MM3 (ref 140–450)
POTASSIUM SERPL-SCNC: 4.6 MMOL/L (ref 3.5–5.2)
PROT SERPL-MCNC: 6.8 G/DL (ref 6–8.5)
RBC # BLD AUTO: 4.83 10*6/MM3 (ref 3.77–5.28)
SODIUM SERPL-SCNC: 141 MMOL/L (ref 136–145)
TRIGL SERPL-MCNC: 127 MG/DL (ref 0–150)
VLDLC SERPL CALC-MCNC: 23 MG/DL (ref 5–40)
WBC # BLD AUTO: 6.19 10*3/MM3 (ref 3.4–10.8)

## 2024-05-10 ENCOUNTER — OFFICE VISIT (OUTPATIENT)
Dept: FAMILY MEDICINE CLINIC | Facility: CLINIC | Age: 84
End: 2024-05-10
Payer: MEDICARE

## 2024-05-10 VITALS
DIASTOLIC BLOOD PRESSURE: 60 MMHG | WEIGHT: 149.8 LBS | BODY MASS INDEX: 25.57 KG/M2 | SYSTOLIC BLOOD PRESSURE: 102 MMHG | OXYGEN SATURATION: 96 % | HEART RATE: 68 BPM | RESPIRATION RATE: 18 BRPM | HEIGHT: 64 IN | TEMPERATURE: 98.4 F

## 2024-05-10 DIAGNOSIS — Z13.820 SCREENING FOR OSTEOPOROSIS: ICD-10-CM

## 2024-05-10 DIAGNOSIS — E78.00 PURE HYPERCHOLESTEROLEMIA: Primary | ICD-10-CM

## 2024-05-10 DIAGNOSIS — Z23 NEED FOR PNEUMOCOCCAL VACCINE: ICD-10-CM

## 2024-05-10 DIAGNOSIS — M81.8 OTHER OSTEOPOROSIS WITHOUT CURRENT PATHOLOGICAL FRACTURE: ICD-10-CM

## 2024-05-10 PROBLEM — M13.812 ALLERGIC ARTHRITIS OF LEFT SHOULDER REGION: Status: ACTIVE | Noted: 2024-05-10

## 2024-05-10 PROBLEM — M19.90 ARTHRITIS: Status: ACTIVE | Noted: 2024-05-10

## 2024-05-10 PROBLEM — M19.90 ARTHRITIS: Status: RESOLVED | Noted: 2024-05-10 | Resolved: 2024-05-10

## 2024-05-10 PROCEDURE — 3078F DIAST BP <80 MM HG: CPT | Performed by: INTERNAL MEDICINE

## 2024-05-10 PROCEDURE — 99213 OFFICE O/P EST LOW 20 MIN: CPT | Performed by: INTERNAL MEDICINE

## 2024-05-10 PROCEDURE — G0009 ADMIN PNEUMOCOCCAL VACCINE: HCPCS | Performed by: INTERNAL MEDICINE

## 2024-05-10 PROCEDURE — 90677 PCV20 VACCINE IM: CPT | Performed by: INTERNAL MEDICINE

## 2024-05-10 PROCEDURE — 3074F SYST BP LT 130 MM HG: CPT | Performed by: INTERNAL MEDICINE

## 2024-05-10 RX ORDER — EZETIMIBE 10 MG/1
10 TABLET ORAL DAILY
Qty: 90 TABLET | Refills: 1 | Status: SHIPPED | OUTPATIENT
Start: 2024-05-10

## 2024-05-10 NOTE — ASSESSMENT & PLAN NOTE
Lipid abnormalities are worsening    Plan:  Continue same medication/s without change.   and Begin taking the following medication/s; Zetia 10 mg daily.    Counseled patient on lifestyle modifications to help control hyperlipidemia.   Cholesterol lowering dietary information shared with patient.  Advised patient to exercise for 150 minutes weekly. (30 minute brisk walk, 5 days a week for example)  Weight Loss encouraged    Patient Treatment Goals:   LDL goal is under 100    Followup in 3 months.

## 2024-05-10 NOTE — PROGRESS NOTES
Chief Complaint   Patient presents with    Follow-up     Lab follow up, myrbetriq to expensive    Hypertension   History of Present Illness:  Patient is here today for follow up on labs. Lab review indicate worsening lipid abnormalities with elevated Total/LDL cholesterol. She reports was previously on statin therapy but developed severe side effects due to muscle cramps and pain. She tried diet control but was not able to achieve target goal of LDL <100. The rest of her blood work are essentially normal. She reports mirabegron prescribed for her urge incontinence was too expensive for her to afford and has decided to go with conservative mgt with bladder training for now.      She is due for Dexa scan and Pneumococcal vaccine.     PMH:   Outpatient Medications Prior to Visit   Medication Sig Dispense Refill    aspirin 81 MG EC tablet Take 1 tablet by mouth Daily.      bisoprolol (ZEBeta) 10 MG tablet Take 1 tablet by mouth Daily. 30 tablet 2    Mirabegron ER (MYRBETRIQ) 25 MG tablet sustained-release 24 hour 24 hr tablet Take 1 tablet by mouth Daily. 30 tablet 0    predniSONE (DELTASONE) 50 MG tablet Take 1 tablet by mouth Take As Directed for 2 doses. Take 1 tablet (50mg) 13 Hours & 7 Hours Prior to Exam 2 tablet 0    Probiotic Product (LeukoDx) capsule capsule Take 1 capsule by mouth Daily.      vitamin D3 125 MCG (5000 UT) capsule capsule Take 1 capsule by mouth Daily.      VITAMIN E 400 UNIT capsule Take 2 capsules by mouth Daily.       No facility-administered medications prior to visit.      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    Succinylcholine Other (See Comments)     Paralysis     Past Surgical History:   Procedure Laterality Date    HYSTERECTOMY  1976    TOTAL HIP ARTHROPLASTY REVISION       family history is not on file.   reports that she quit smoking about  "63 years ago. Her smoking use included cigarettes. She started smoking about 64 years ago. She has a 0.3 pack-year smoking history. She has been exposed to tobacco smoke. She has never used smokeless tobacco. She reports current alcohol use. She reports that she does not use drugs.     /60 (BP Location: Right arm, Patient Position: Sitting, Cuff Size: Adult)   Pulse 68   Temp 98.4 °F (36.9 °C) (Oral)   Resp 18   Ht 162.6 cm (64.02\")   Wt 67.9 kg (149 lb 12.8 oz)   LMP  (LMP Unknown)   SpO2 96%   BMI 25.70 kg/m²   Physical Exam  Constitutional:       Appearance: Normal appearance.   HENT:      Head: Normocephalic and atraumatic.   Neurological:      Mental Status: She is alert and oriented to person, place, and time.   Psychiatric:         Mood and Affect: Mood normal.          The following data was reviewed by: Berenice Juarez MD on 05/10/2024:  Common labs          11/27/2023    11:19 5/7/2024    09:24   Common Labs   Glucose 102  95    BUN 13  15    Creatinine 0.67  0.73    Sodium 140  141    Potassium 3.9  4.6    Chloride 104  104    Calcium 9.1  9.3    Total Protein  6.8    Albumin 4.2  4.7    Total Bilirubin 0.5  0.6    Alkaline Phosphatase 75  64    AST (SGOT) 18  15    ALT (SGPT) 16  13    WBC 6.06  6.19    Hemoglobin 13.4  14.1    Hematocrit 42.8  43.4    Platelets 184  175    Total Cholesterol  260    Triglycerides  127    HDL Cholesterol  60    LDL Cholesterol   177    Hemoglobin A1C  5.50           Diagnoses and all orders for this visit:    1. Pure hypercholesterolemia (Primary)  -     ezetimibe (Zetia) 10 MG tablet; Take 1 tablet by mouth Daily.  Dispense: 90 tablet; Refill: 1  -     Lipid Panel With / Chol / HDL Ratio; Future    2. Screening for osteoporosis  -     DEXA Bone Density Axial    3. Other osteoporosis without current pathological fracture  -     DEXA Bone Density Axial    4. Need for pneumococcal vaccine  -     Pneumococcal Conjugate Vaccine 20-Valent (PCV20)     Encouraged " patient to get her RSV, shingles and Covid vaccine at her preferred pharmacy        Return in about 3 months (around 8/10/2024) for Recheck with repeat fasting labs.     Answers submitted by the patient for this visit:  Other (Submitted on 5/9/2024)  Please describe your symptoms.: Visit following lab results  Have you had these symptoms before?: Yes  How long have you been having these symptoms?: Greater than 2 weeks  Please list any medications you are currently taking for this condition.: Tylenol  Please describe any probable cause for these symptoms. : Not known  Primary Reason for Visit (Submitted on 5/9/2024)  What is the primary reason for your visit?: Other

## 2024-05-12 DIAGNOSIS — I10 PRIMARY HYPERTENSION: ICD-10-CM

## 2024-05-13 RX ORDER — BISOPROLOL FUMARATE 10 MG/1
10 TABLET, FILM COATED ORAL DAILY
Qty: 90 TABLET | Refills: 1 | Status: SHIPPED | OUTPATIENT
Start: 2024-05-13

## 2024-05-24 ENCOUNTER — OFFICE VISIT (OUTPATIENT)
Dept: NEUROLOGY | Facility: CLINIC | Age: 84
End: 2024-05-24
Payer: MEDICARE

## 2024-05-24 VITALS
DIASTOLIC BLOOD PRESSURE: 64 MMHG | BODY MASS INDEX: 25.7 KG/M2 | HEART RATE: 68 BPM | OXYGEN SATURATION: 95 % | HEIGHT: 64 IN | SYSTOLIC BLOOD PRESSURE: 112 MMHG

## 2024-05-24 DIAGNOSIS — R25.1 TREMOR: Primary | ICD-10-CM

## 2024-05-24 PROCEDURE — 1159F MED LIST DOCD IN RCRD: CPT | Performed by: PSYCHIATRY & NEUROLOGY

## 2024-05-24 PROCEDURE — 1160F RVW MEDS BY RX/DR IN RCRD: CPT | Performed by: PSYCHIATRY & NEUROLOGY

## 2024-05-24 PROCEDURE — 3074F SYST BP LT 130 MM HG: CPT | Performed by: PSYCHIATRY & NEUROLOGY

## 2024-05-24 PROCEDURE — 99204 OFFICE O/P NEW MOD 45 MIN: CPT | Performed by: PSYCHIATRY & NEUROLOGY

## 2024-05-24 PROCEDURE — 3078F DIAST BP <80 MM HG: CPT | Performed by: PSYCHIATRY & NEUROLOGY

## 2024-05-24 NOTE — LETTER
May 24, 2024       No Recipients    Patient: Yareli Fowler   YOB: 1940   Date of Visit: 5/24/2024     Dear Berenice Juarez MD:       Thank you for referring Yareli Fowler to me for evaluation. Below are the relevant portions of my assessment and plan of care.    If you have questions, please do not hesitate to call me. I look forward to following Yareli along with you.         Sincerely,        Kristy Mares MD        CC:   No Recipients    Kristy Mares MD  05/24/24 1152  Sign when Signing Visit  Chief Complaint  Tremors (Right hand/)    Subjective         Yareli Fowler presents to [facility] NEUROLOG[facility] for   HISTORY OF PRESENT ILLNESS:    History reviewed. No pertinent past medical history.     Yareli Fowler is a 83 year old right handed woman who presents to neurology clinic with her  Jose for initial evaluation and treatment of tremors.  She reports noticing the tremors in her signature and her writing over 10 years ago.  She reports having some tremor in her right hand at rest which is occasional and comes and goes but previously she noticed the tremors with action and doing things with her hands.  She has a hard time doing meticulous work with her hands and fine motor movements.  She has had carpal tunnel in her right hand for which she had carpal tunnel release procedure performed in 2022.  She reports living in [place] for about 10 years and recently moved back.  She had a brain MRI scan in [place] in 10/2021 which was read as demonstrating marked chronic small vessel ischemic disease with no focal or acute abnormality.  She has had recent CMP, HgbA1c, TSH, CRP and sed rate which are all normal.  She has significant arthritis and she has a hard time getting around due to significant degeneration in her knees.  She reports that her mother's half brother had some tremors.  She has not slowed down significantly with her gait.  Her voice has not changed  "over the past several years.  She has not had any recent falls.  No major issues with memory.  They are handling their own finances and affairs.  She does report her sense of smell is not as good as her husbands but still smells things fine.  She has rolled out of bed one time in [place], she does not have weird dreams and has not acted out her sleep as far as she knows.  She does not dream much at all.  She does not drink caffeine.  She does not smoke.  She might have a glass of wine once a month at most.      Family History   Problem Relation Age of Onset   • Ovarian cancer Neg Hx    • Breast cancer Neg Hx         Social History     Socioeconomic History   • Marital status:    Tobacco Use   • Smoking status: Former     Current packs/day: 0.00     Average packs/day: 0.3 packs/day for 1 year (0.3 ttl pk-yrs)     Types: Cigarettes     Start date:      Quit date:      Years since quittin.4     Passive exposure: Past   • Smokeless tobacco: Never   Vaping Use   • Vaping status: Never Used   Substance and Sexual Activity   • Alcohol use: Yes     Comment: A glass of wine monthly   • Drug use: Never   • Sexual activity: Defer        I have reviewed and confirmed the accuracy of the ROS as documented by the MA/LPN/RN Kristy Mares MD   Review of Systems   Neurological:  Positive for tremors (right hand) and weakness (joshua shoulder, arms and hands). Negative for dizziness, seizures, syncope, facial asymmetry, speech difficulty, light-headedness, numbness, headache, memory problem and confusion.        Objective  Vital Signs:   /64   Pulse 68   Ht 162.6 cm (64.02\")   SpO2 95%   BMI 25.70 kg/m²       PHYSICAL EXAM:    General   Mental Status - Alert. General Appearance - Well developed, Well groomed, Oriented and Cooperative. Orientation - Oriented X3.       Head and Neck  Head - normocephalic, atraumatic with no lesions or palpable masses.  Neck    Global Assessment - supple.       Eye "   Sclera/Conjunctiva - Bilateral - Normal.    ENMT  Mouth and Throat   Oral Cavity/Oropharynx: Oropharynx - the soft palate,uvula and tongue are normal in appearance.    Chest and Lung Exam   Chest - lung clear to auscultation bilaterally.    Cardiovascular   Cardiovascular examination reveals  - normal heart sounds, regular rate and rhythm.    Neurologic   Mental Status: Speech - Normal. Cognitive function - appropriate fund of knowledge. No impairment of attention, Impairment of concentration, impairment of long term memory or impairment of short term memory.  Cranial Nerves:   II Optic: Visual acuity - Left - Normal. Right - Normal. Visual fields - Normal (to confrontation).  III Oculomotor: Pupillary constriction - Left - Normal. Right - Normal.  VII Facial: - Normal Bilaterally.   IX Glossopharyngeal / X Vagus - Normal.  XI Accessory: Trapezius - Bilateral - Normal. Sternocleidomastoid - Bilateral - Normal.  XII Hypoglossal - Bilateral - Normal.  Eye Movements: - Normal Bilaterally.  Sensory:   Light Touch: Intact - Globally.  Motor:   Bulk and Contour: - Normal.  Tone: - Normal.  Tremor: Very mild fast frequency action tremor noted in right hand.   Strength: 5/5 normal muscle strength - All Muscles.   General Assessment of Reflexes: - deep tendon reflexes are normal. Coordination - No Impairment of finger-to-nose or Impairment of rapid alternating movements. Gait - Normal.       Result Review:                 Assessment and Plan    Problem List Items Addressed This Visit          Neuro    Tremor - Primary    Current Assessment & Plan     83 year old right handed woman with tremors.  She reports noticing the tremors in her signature and her writing over 10 years ago.  She reports having some tremor in her right hand at rest which is occasional and comes and goes but previously she noticed the tremors with action and doing things with her hands.  She has a hard time doing meticulous work with her hands and fine  motor movements.  She has had carpal tunnel in her right hand for which she had carpal tunnel release procedure performed in 2022.  She reports living in [place] for about 10 years and recently moved back.  She had a brain MRI scan in [place] in 10/2021 which was read as demonstrating marked chronic small vessel ischemic disease with no focal or acute abnormality.  She has had recent CMP, HgbA1c, TSH, CRP and sed rate which are all normal.  She has significant arthritis and she has a hard time getting around due to significant degeneration in her knees.  She reports that her mother's half brother had some tremors.  She has not slowed down significantly with her gait.  Her voice has not changed over the past several years.  She has not had any recent falls.  No major issues with memory.  They are handling their own finances and affairs.  She does report her sense of smell is not as good as her husbands but still smells things fine.  She has rolled out of bed one time in [place], she does not have weird dreams and has not acted out her sleep as far as she knows.  She does not dream much at all.  She does not drink caffeine.  She does not smoke.  She might have a glass of wine once a month at most.  I spoke with her about the findings on examination today not being consistent with PD but very mild essential tremor.  She denies this being significantly impactful on her daily activities so at this time we will hold off on starting any new medications.  She is on bisoprolol currently.  Provided patient education information on essential tremors.              I spent 50 minutes caring for Yareli on this date of service. This time includes time spent by me in the following activities:preparing for the visit, reviewing tests, obtaining and/or reviewing a separately obtained history, performing a medically appropriate examination and/or evaluation , counseling and educating the patient/family/caregiver, documenting  information in the medical record, and care coordination    Follow Up   Return if symptoms worsen or fail to improve.  Patient was given instructions and counseling regarding her condition or for health maintenance advice. Please see specific information pulled into the AVS if appropriate.

## 2024-05-24 NOTE — ASSESSMENT & PLAN NOTE
83 year old right handed woman with tremors.  She reports noticing the tremors in her signature and her writing over 10 years ago.  She reports having some tremor in her right hand at rest which is occasional and comes and goes but previously she noticed the tremors with action and doing things with her hands.  She has a hard time doing meticulous work with her hands and fine motor movements.  She has had carpal tunnel in her right hand for which she had carpal tunnel release procedure performed in 2022.  She reports living in [place] for about 10 years and recently moved back.  She had a brain MRI scan in [place] in 10/2021 which was read as demonstrating marked chronic small vessel ischemic disease with no focal or acute abnormality.  She has had recent CMP, HgbA1c, TSH, CRP and sed rate which are all normal.  She has significant arthritis and she has a hard time getting around due to significant degeneration in her knees.  She reports that her mother's half brother had some tremors.  She has not slowed down significantly with her gait.  Her voice has not changed over the past several years.  She has not had any recent falls.  No major issues with memory.  They are handling their own finances and affairs.  She does report her sense of smell is not as good as her husbands but still smells things fine.  She has rolled out of bed one time in [place], she does not have weird dreams and has not acted out her sleep as far as she knows.  She does not dream much at all.  She does not drink caffeine.  She does not smoke.  She might have a glass of wine once a month at most.  I spoke with her about the findings on examination today not being consistent with PD but very mild essential tremor.  She denies this being significantly impactful on her daily activities so at this time we will hold off on starting any new medications.  She is on bisoprolol currently.  Provided patient education information on essential  tremors.

## 2024-05-24 NOTE — PROGRESS NOTES
Chief Complaint  Tremors (Right hand/)    Subjective          Yareli Fowler presents to [facility] NEUROLOGY for   HISTORY OF PRESENT ILLNESS:    History reviewed. No pertinent past medical history.     Yareli Fowler is a 83 year old right handed woman who presents to neurology clinic with her  Jose for initial evaluation and treatment of tremors.  She reports noticing the tremors in her signature and her writing over 10 years ago.  She reports having some tremor in her right hand at rest which is occasional and comes and goes but previously she noticed the tremors with action and doing things with her hands.  She has a hard time doing meticulous work with her hands and fine motor movements.  She has had carpal tunnel in her right hand for which she had carpal tunnel release procedure performed in 2022.  She reports living in [place] for about 10 years and recently moved back.  She had a brain MRI scan in [place] in 10/2021 which was read as demonstrating marked chronic small vessel ischemic disease with no focal or acute abnormality.  She has had recent CMP, HgbA1c, TSH, CRP and sed rate which are all normal.  She has significant arthritis and she has a hard time getting around due to significant degeneration in her knees.  She reports that her mother's half brother had some tremors.  She has not slowed down significantly with her gait.  Her voice has not changed over the past several years.  She has not had any recent falls.  No major issues with memory.  They are handling their own finances and affairs.  She does report her sense of smell is not as good as her husbands but still smells things fine.  She has rolled out of bed one time in [place], she does not have weird dreams and has not acted out her sleep as far as she knows.  She does not dream much at all.  She does not drink caffeine.  She does not smoke.  She might have a glass of wine once a month at most.      Family  "History   Problem Relation Age of Onset    Ovarian cancer Neg Hx     Breast cancer Neg Hx         Social History     Socioeconomic History    Marital status:    Tobacco Use    Smoking status: Former     Current packs/day: 0.00     Average packs/day: 0.3 packs/day for 1 year (0.3 ttl pk-yrs)     Types: Cigarettes     Start date:      Quit date:      Years since quittin.4     Passive exposure: Past    Smokeless tobacco: Never   Vaping Use    Vaping status: Never Used   Substance and Sexual Activity    Alcohol use: Yes     Comment: A glass of wine monthly    Drug use: Never    Sexual activity: Defer        I have reviewed and confirmed the accuracy of the ROS as documented by the MA/LPN/RN Kristy Mares MD   Review of Systems   Neurological:  Positive for tremors (right hand) and weakness (joshua shoulder, arms and hands). Negative for dizziness, seizures, syncope, facial asymmetry, speech difficulty, light-headedness, numbness, headache, memory problem and confusion.        Objective   Vital Signs:   /64   Pulse 68   Ht 162.6 cm (64.02\")   SpO2 95%   BMI 25.70 kg/m²       PHYSICAL EXAM:    General   Mental Status - Alert. General Appearance - Well developed, Well groomed, Oriented and Cooperative. Orientation - Oriented X3.       Head and Neck  Head - normocephalic, atraumatic with no lesions or palpable masses.  Neck    Global Assessment - supple.       Eye   Sclera/Conjunctiva - Bilateral - Normal.    ENMT  Mouth and Throat   Oral Cavity/Oropharynx: Oropharynx - the soft palate,uvula and tongue are normal in appearance.    Chest and Lung Exam   Chest - lung clear to auscultation bilaterally.    Cardiovascular   Cardiovascular examination reveals  - normal heart sounds, regular rate and rhythm.    Neurologic   Mental Status: Speech - Normal. Cognitive function - appropriate fund of knowledge. No impairment of attention, Impairment of concentration, impairment of long term memory or " impairment of short term memory.  Cranial Nerves:   II Optic: Visual acuity - Left - Normal. Right - Normal. Visual fields - Normal (to confrontation).  III Oculomotor: Pupillary constriction - Left - Normal. Right - Normal.  VII Facial: - Normal Bilaterally.   IX Glossopharyngeal / X Vagus - Normal.  XI Accessory: Trapezius - Bilateral - Normal. Sternocleidomastoid - Bilateral - Normal.  XII Hypoglossal - Bilateral - Normal.  Eye Movements: - Normal Bilaterally.  Sensory:   Light Touch: Intact - Globally.  Motor:   Bulk and Contour: - Normal.  Tone: - Normal.  Tremor: Very mild fast frequency action tremor noted in right hand.   Strength: 5/5 normal muscle strength - All Muscles.   General Assessment of Reflexes: - deep tendon reflexes are normal. Coordination - No Impairment of finger-to-nose or Impairment of rapid alternating movements. Gait - Normal.       Result Review :                 Assessment and Plan    Problem List Items Addressed This Visit          Neuro    Tremor - Primary    Current Assessment & Plan     83 year old right handed woman with tremors.  She reports noticing the tremors in her signature and her writing over 10 years ago.  She reports having some tremor in her right hand at rest which is occasional and comes and goes but previously she noticed the tremors with action and doing things with her hands.  She has a hard time doing meticulous work with her hands and fine motor movements.  She has had carpal tunnel in her right hand for which she had carpal tunnel release procedure performed in 2022.  She reports living in [place] for about 10 years and recently moved back.  She had a brain MRI scan in [place] in 10/2021 which was read as demonstrating marked chronic small vessel ischemic disease with no focal or acute abnormality.  She has had recent CMP, HgbA1c, TSH, CRP and sed rate which are all normal.  She has significant arthritis and she has a hard time getting around due to  significant degeneration in her knees.  She reports that her mother's half brother had some tremors.  She has not slowed down significantly with her gait.  Her voice has not changed over the past several years.  She has not had any recent falls.  No major issues with memory.  They are handling their own finances and affairs.  She does report her sense of smell is not as good as her husbands but still smells things fine.  She has rolled out of bed one time in [place], she does not have weird dreams and has not acted out her sleep as far as she knows.  She does not dream much at all.  She does not drink caffeine.  She does not smoke.  She might have a glass of wine once a month at most.  I spoke with her about the findings on examination today not being consistent with PD but very mild essential tremor.  She denies this being significantly impactful on her daily activities so at this time we will hold off on starting any new medications.  She is on bisoprolol currently.  Provided patient education information on essential tremors.              I spent 50 minutes caring for Yareli on this date of service. This time includes time spent by me in the following activities:preparing for the visit, reviewing tests, obtaining and/or reviewing a separately obtained history, performing a medically appropriate examination and/or evaluation , counseling and educating the patient/family/caregiver, documenting information in the medical record, and care coordination    Follow Up   Return if symptoms worsen or fail to improve.  Patient was given instructions and counseling regarding her condition or for health maintenance advice. Please see specific information pulled into the AVS if appropriate.

## 2024-05-28 ENCOUNTER — TELEPHONE (OUTPATIENT)
Dept: FAMILY MEDICINE CLINIC | Facility: CLINIC | Age: 84
End: 2024-05-28

## 2024-05-28 NOTE — TELEPHONE ENCOUNTER
Caller: Caravladimir Yareli    Relationship: Self    Best call back number: [phone]    What medication are you requesting: ANTIBIOTIC    Have you had these symptoms before:    [x] Yes  [] No    Have you been treated for these symptoms before:   [x] Yes  [] No    If a prescription is needed, what is your preferred pharmacy and phone number: [id number] - [place], [place] - [address] [address]. AT [facility] [phone] [facility] [phone] FX     Additional notes:      PATIENT HAS A DENTAL APPOINTMENT SET UP FOR 6/3/24 AND DUE TO HER IMPLANTS IS REQUIRING AN ANTIBIOTIC TO TAKE PRIOR TO THIS DENTAL APPOINTMENT.    PLEASE ADVISE

## 2024-06-27 ENCOUNTER — HOSPITAL ENCOUNTER (OUTPATIENT)
Dept: BONE DENSITY | Facility: HOSPITAL | Age: 84
Discharge: HOME | End: 2024-06-27
Admitting: INTERNAL MEDICINE
Payer: MEDICARE

## 2024-06-27 PROCEDURE — 77080 DXA BONE DENSITY AXIAL: CPT

## 2024-11-14 DIAGNOSIS — I10 PRIMARY HYPERTENSION: ICD-10-CM

## 2024-11-15 RX ORDER — BISOPROLOL FUMARATE 10 MG/1
10 TABLET, FILM COATED ORAL DAILY
Qty: 30 TABLET | Refills: 0 | Status: SHIPPED | OUTPATIENT
Start: 2024-11-15

## 2024-11-27 ENCOUNTER — TELEPHONE (OUTPATIENT)
Dept: FAMILY MEDICINE CLINIC | Facility: CLINIC | Age: 84
End: 2024-11-27
Payer: MEDICARE

## 2024-11-27 NOTE — TELEPHONE ENCOUNTER
Patient called stated she had a blurry vision and passed out. She did not hit her head. I did advised patient to get seen at the urgent care to get eval. Also made patient appointment for 12/2/24 with Dr. Juarez

## 2024-11-28 ENCOUNTER — APPOINTMENT (OUTPATIENT)
Dept: CT IMAGING | Facility: HOSPITAL | Age: 84
End: 2024-11-28
Payer: MEDICARE

## 2024-11-28 ENCOUNTER — HOSPITAL ENCOUNTER (EMERGENCY)
Facility: HOSPITAL | Age: 84
Discharge: HOME | End: 2024-11-28
Attending: EMERGENCY MEDICINE | Admitting: EMERGENCY MEDICINE
Payer: MEDICARE

## 2024-11-28 VITALS
HEIGHT: 64 IN | RESPIRATION RATE: 18 BRPM | HEART RATE: 68 BPM | BODY MASS INDEX: 24.41 KG/M2 | SYSTOLIC BLOOD PRESSURE: 119 MMHG | WEIGHT: 143 LBS | DIASTOLIC BLOOD PRESSURE: 90 MMHG | OXYGEN SATURATION: 94 % | TEMPERATURE: 98 F

## 2024-11-28 DIAGNOSIS — N30.90 CYSTITIS: Primary | ICD-10-CM

## 2024-11-28 LAB
ALBUMIN SERPL-MCNC: 4.3 G/DL (ref 3.5–5.2)
ALBUMIN/GLOB SERPL: 1.7 G/DL
ALP SERPL-CCNC: 78 U/L (ref 39–117)
ALT SERPL W P-5'-P-CCNC: 16 U/L (ref 1–33)
ANION GAP SERPL CALCULATED.3IONS-SCNC: 11.1 MMOL/L (ref 5–15)
AST SERPL-CCNC: 14 U/L (ref 1–32)
BACTERIA UR QL AUTO: ABNORMAL /HPF
BASOPHILS # BLD AUTO: 0.02 10*3/MM3 (ref 0–0.2)
BASOPHILS NFR BLD AUTO: 0.2 % (ref 0–1.5)
BILIRUB SERPL-MCNC: 0.6 MG/DL (ref 0–1.2)
BILIRUB UR QL STRIP: NEGATIVE
BUN SERPL-MCNC: 17 MG/DL (ref 8–23)
BUN/CREAT SERPL: 26.2 (ref 7–25)
CALCIUM SPEC-SCNC: 9.2 MG/DL (ref 8.6–10.5)
CHLORIDE SERPL-SCNC: 102 MMOL/L (ref 98–107)
CLARITY UR: ABNORMAL
CO2 SERPL-SCNC: 25.9 MMOL/L (ref 22–29)
COD CRY URNS QL: PRESENT /HPF
COLOR UR: ABNORMAL
CREAT SERPL-MCNC: 0.65 MG/DL (ref 0.57–1)
D DIMER PPP FEU-MCNC: 0.58 MCGFEU/ML (ref 0–0.84)
DEPRECATED RDW RBC AUTO: 47.6 FL (ref 37–54)
EGFRCR SERPLBLD CKD-EPI 2021: 86.9 ML/MIN/1.73
EOSINOPHIL # BLD AUTO: 0.09 10*3/MM3 (ref 0–0.4)
EOSINOPHIL NFR BLD AUTO: 1 % (ref 0.3–6.2)
ERYTHROCYTE [DISTWIDTH] IN BLOOD BY AUTOMATED COUNT: 14.2 % (ref 12.3–15.4)
GLOBULIN UR ELPH-MCNC: 2.6 GM/DL
GLUCOSE SERPL-MCNC: 112 MG/DL (ref 65–99)
GLUCOSE UR STRIP-MCNC: NEGATIVE MG/DL
HCT VFR BLD AUTO: 42.1 % (ref 34–46.6)
HGB BLD-MCNC: 13.5 G/DL (ref 12–15.9)
HGB UR QL STRIP.AUTO: NEGATIVE
HOLD SPECIMEN: NORMAL
HYALINE CASTS UR QL AUTO: ABNORMAL /LPF
IMM GRANULOCYTES # BLD AUTO: 0.01 10*3/MM3 (ref 0–0.05)
IMM GRANULOCYTES NFR BLD AUTO: 0.1 % (ref 0–0.5)
KETONES UR QL STRIP: ABNORMAL
LEUKOCYTE ESTERASE UR QL STRIP.AUTO: ABNORMAL
LIPASE SERPL-CCNC: 42 U/L (ref 13–60)
LYMPHOCYTES # BLD AUTO: 0.93 10*3/MM3 (ref 0.7–3.1)
LYMPHOCYTES NFR BLD AUTO: 10 % (ref 19.6–45.3)
MCH RBC QN AUTO: 28.7 PG (ref 26.6–33)
MCHC RBC AUTO-ENTMCNC: 32.1 G/DL (ref 31.5–35.7)
MCV RBC AUTO: 89.6 FL (ref 79–97)
MONOCYTES # BLD AUTO: 0.7 10*3/MM3 (ref 0.1–0.9)
MONOCYTES NFR BLD AUTO: 7.5 % (ref 5–12)
NEUTROPHILS NFR BLD AUTO: 7.53 10*3/MM3 (ref 1.7–7)
NEUTROPHILS NFR BLD AUTO: 81.2 % (ref 42.7–76)
NITRITE UR QL STRIP: POSITIVE
PH UR STRIP.AUTO: <=5 [PH] (ref 5–8)
PLATELET # BLD AUTO: 168 10*3/MM3 (ref 140–450)
PMV BLD AUTO: 12 FL (ref 6–12)
POTASSIUM SERPL-SCNC: 3.9 MMOL/L (ref 3.5–5.2)
PROT SERPL-MCNC: 6.9 G/DL (ref 6–8.5)
PROT UR QL STRIP: ABNORMAL
QT INTERVAL: 414 MS
QTC INTERVAL: 421 MS
RBC # BLD AUTO: 4.7 10*6/MM3 (ref 3.77–5.28)
RBC # UR STRIP: ABNORMAL /HPF
REF LAB TEST METHOD: ABNORMAL
SODIUM SERPL-SCNC: 139 MMOL/L (ref 136–145)
SP GR UR STRIP: 1.02 (ref 1–1.03)
SQUAMOUS #/AREA URNS HPF: ABNORMAL /HPF
UROBILINOGEN UR QL STRIP: ABNORMAL
WBC # UR STRIP: ABNORMAL /HPF
WBC NRBC COR # BLD AUTO: 9.28 10*3/MM3 (ref 3.4–10.8)

## 2024-11-28 PROCEDURE — 80053 COMPREHEN METABOLIC PANEL: CPT | Performed by: EMERGENCY MEDICINE

## 2024-11-28 PROCEDURE — 85025 COMPLETE CBC W/AUTO DIFF WBC: CPT | Performed by: EMERGENCY MEDICINE

## 2024-11-28 PROCEDURE — 93005 ELECTROCARDIOGRAM TRACING: CPT | Performed by: EMERGENCY MEDICINE

## 2024-11-28 PROCEDURE — 81001 URINALYSIS AUTO W/SCOPE: CPT | Performed by: EMERGENCY MEDICINE

## 2024-11-28 PROCEDURE — 99284 EMERGENCY DEPT VISIT MOD MDM: CPT

## 2024-11-28 PROCEDURE — 70450 CT HEAD/BRAIN W/O DYE: CPT

## 2024-11-28 PROCEDURE — 83690 ASSAY OF LIPASE: CPT | Performed by: EMERGENCY MEDICINE

## 2024-11-28 PROCEDURE — 85379 FIBRIN DEGRADATION QUANT: CPT | Performed by: EMERGENCY MEDICINE

## 2024-11-28 PROCEDURE — 87086 URINE CULTURE/COLONY COUNT: CPT | Performed by: EMERGENCY MEDICINE

## 2024-11-28 PROCEDURE — 36415 COLL VENOUS BLD VENIPUNCTURE: CPT

## 2024-11-28 PROCEDURE — 99284 EMERGENCY DEPT VISIT MOD MDM: CPT | Performed by: EMERGENCY MEDICINE

## 2024-11-28 RX ORDER — CEPHALEXIN 500 MG/1
500 CAPSULE ORAL ONCE
Status: COMPLETED | OUTPATIENT
Start: 2024-11-28 | End: 2024-11-28

## 2024-11-28 RX ORDER — CEPHALEXIN 500 MG/1
500 CAPSULE ORAL 2 TIMES DAILY
Qty: 14 CAPSULE | Refills: 0 | Status: SHIPPED | OUTPATIENT
Start: 2024-11-28 | End: 2024-12-05

## 2024-11-28 RX ADMIN — CEPHALEXIN 500 MG: 500 CAPSULE ORAL at 12:46

## 2024-11-28 NOTE — FSED PROVIDER NOTE
Subjective   History of Present Illness  84-year-old female with a history of ocular migraines causing change in vision on November 26 was walking down the hallway in her house focused on her vision with an ocular migraine and ended up on the floor and within seconds her  was there and she was awake.  She is not sure what happened.  No headaches or neck pain prior to or after.  No shortness of breath or chest pain or back pain or abdominal pain prior to or after.  She felt very fatigued afterwards and she had had a clear runny nose for a day or 2 and the fatigue was worse as she slept most of the day in the bed after the syncopal event.  Since she has felt her self.  She is on a beta-blocker for periodic rapid heartbeat which she has been on for years and she wonders if she should even be on it at the moment.  Her blood pressure tends to run even without it normal.  She is a retired surgical nurse.  She also had an event on November 22 where she rolled out of bed and has a large bruise on the inner's aspect of her right upper extremity from that event.  She did not hit her head or have any other injuries and she was able to get up off the floor by herself.  She has a family history parent and sibling that had strokes.  She has had no weakness or changes in her ability to function or speak or think.  The ocular migraines have been going on for many many years.  She has no other complaints.      Review of Systems    History reviewed. No pertinent past medical history.    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    Succinylcholine Other (See Comments)     Paralysis       Past Surgical History:   Procedure Laterality Date    HYSTERECTOMY  1976    TOTAL HIP ARTHROPLASTY REVISION         Family History   Problem Relation Age of Onset    Ovarian cancer Neg Hx     Breast cancer Neg Hx         Social History     Socioeconomic History    Marital status:    Tobacco Use    Smoking status: Former     Current packs/day: 0.00     Average packs/day: 0.3 packs/day for 1 year (0.3 ttl pk-yrs)     Types: Cigarettes     Start date:      Quit date:      Years since quittin.9     Passive exposure: Past    Smokeless tobacco: Never   Vaping Use    Vaping status: Never Used   Substance and Sexual Activity    Alcohol use: Yes     Comment: A glass of wine monthly    Drug use: Never    Sexual activity: Defer           Objective   Physical Exam  Vitals and nursing note reviewed.   Constitutional:       General: She is not in acute distress.     Appearance: Normal appearance. She is normal weight. She is not ill-appearing or toxic-appearing.   HENT:      Head: Normocephalic and atraumatic.      Mouth/Throat:      Mouth: Mucous membranes are moist.      Pharynx: Oropharynx is clear.   Eyes:      Extraocular Movements: Extraocular movements intact.      Conjunctiva/sclera: Conjunctivae normal.      Pupils: Pupils are equal, round, and reactive to light.   Cardiovascular:      Rate and Rhythm: Normal rate and regular rhythm.      Pulses: Normal pulses.      Heart sounds: No murmur heard.  Pulmonary:      Effort: Pulmonary effort is normal. No respiratory distress.      Breath sounds: Normal breath sounds. No stridor. No wheezing, rhonchi or rales.   Musculoskeletal:         General: Normal range of motion.      Cervical back: Neck supple. No tenderness.      Right lower leg: No edema.      Left lower leg: No edema.   Skin:     General: Skin is warm and dry.      Capillary Refill: Capillary refill takes less than 2 seconds.      Coloration: Skin is not jaundiced or pale.      Findings: Bruising present. No erythema or rash.   Neurological:      General: No focal deficit present.      Mental Status: She is alert and oriented to person, place, and time. Mental status is at baseline.      Cranial Nerves: No  cranial nerve deficit.      Motor: No weakness.   Psychiatric:         Mood and Affect: Mood normal.         Behavior: Behavior normal.         Judgment: Judgment normal.         Procedures           ED Course  ED Course as of 11/28/24 1239   Thu Nov 28, 2024   1034 Lab work shows lipase 42 which is normal so no pancreatitis in relation to feeling lightheaded or having syncope.  CMP sugars are 112 which is often a stress reaction with a BUN and a creatinine level slightly elevated and the rest of the CMP is normal.  Dimer is 0.58 so syncope is not related having a PE or other blood clots.  CBC with differential shows slight increase neutrophils percentage from 6% to 81% lymphocytes are down to 10% from a low of 19.6.  So there may be a mild infection going on that may have given her symptoms that are not classic for faction but may be affecting her. [AR]   1037 Head CT     FINDINGS:           No acute intracranial hemorrhage, midline shift or mass effect. No acute  territorial infarct is identified. Basal ganglia mineralization is  present.     Moderate to prominent periventricular hypodensities suggest chronic  small vessel ischemic change in a patient this age.           Ventricles, cisterns, cerebral sulci are unremarkable for patient age.     Mild paranasal sinus mucosal thickening is present. The visualized  paranasal sinuses, orbits, mastoid air cells are otherwise unremarkable.           IMPRESSION:     No acute intracranial hemorrhage or hydrocephalus. Moderate to prominent  periventricular white matter chronic small vessel ischemic change. If  there is further clinical concern, MRI could be considered for further  evaluation.   [AR]   1113 EKG normal sinus rhythm low voltage in the precordial leads rate of 62 LVH inverted T's 3 aVF aVR V1 no evidence of ST elevations no STEMI. [AR]   1218 Treatment for running troponin has been recalibrated 3 times and is still not working.  Patient does not have any  history of chest pain and the event occurred several days ago.  I am going to cancel the troponin. [AR]   1221 UA pending [AR]   1229 Urine is dark yellow slightly cloudy with trace ketones trace protein trace leukocytes and positive nitrites this she may have an infection that is caused her to be a little weaker and dehydrated. [AR]      ED Course User Index  [AR] Maggie Shearer MD                                           Medical Decision Making  Differential syncope includes but not limited to vasovagal, decreased heart rate, aneurysm cardiac or intracranial, hydration, seizure, sudden drop in blood pressure, chest pain, shortness of breath.     Agreed to a workup to rule out things like anemia,, cardiac, features in the brain that may have caused this, dehydration, electrolyte disorders etc. so blood work EKG head CT at this point as the patient is clearly active interactive and appropriate with no dysfunctions today.    Your lab work does not show anything concerning except that the neutrophil percentage which is white blood cells that fight off bacterial infections is slightly elevated, but you do not have any active symptoms in relation to a bacterial infection.  Your urine test showed a bladder infection which may be why we are seeing this in the blood work.  Given your first dose of antibiotic here in the ER and a prescription was sent to the pharmacy you chose that would be open today.  Your head CT did not show anything concerning or emergent today.  The radiologist did suggest she get an MRI in the future as an outpatient.  I think the most important thing is to hydrate-hydrate-hydrate.  To begin lifting weights and working out more to keep yourself fit and to challenge your balance.  Please follow-up with your primary care doctor within 10 days.    Amount and/or Complexity of Data Reviewed  Labs: ordered. Decision-making details documented in ED Course.  Radiology: ordered and independent  interpretation performed.  ECG/medicine tests: ordered.    Risk  Prescription drug management.        Final diagnoses:   Cystitis       ED Disposition  ED Disposition       ED Disposition   Discharge    Condition   Stable    Comment   --               Berenice Juarez MD  [address] [facility] [address]  [place] [id number] [phone]    In 1 week  As needed         Medication List        New Prescriptions      cephalexin 500 MG capsule  Commonly known as: KEFLEX  Take 1 capsule by mouth 2 (Two) Times a Day for 7 days.               Where to Get Your Medications        These medications were sent to [facility]pharm[facility] #[id number] - [place], [place] - [address] [facility] - [phone]  - [phone] [address] [address] [facility] [address]      Phone: [phone]   cephalexin 500 MG capsule

## 2024-11-28 NOTE — ED NOTES
"pt reports she rolled out of bed on 11/22 and fell onto the floor. Pt reports she noticed discoloration to the R shoulder and pain in buttock. Pt denies hitting head, no LOC, and no blood thinner use at that time. On 11/26 pt reports she was walking and passed out and had LOC and was on the floor \"for minutes\".     Zahra Lara RN    "

## 2024-11-28 NOTE — DISCHARGE INSTRUCTIONS
Your lab work does not show anything concerning except that the neutrophil percentage which is white blood cells that fight off bacterial infections is slightly elevated, but you do not have any active symptoms in relation to a bacterial infection.  Your urine test showed a bladder infection which may be why we are seeing this in the blood work.  Given your first dose of antibiotic here in the ER and a prescription was sent to the pharmacy you chose that would be open today.  Your head CT did not show anything concerning or emergent today.  The radiologist did suggest she get an MRI in the future as an outpatient.  I think the most important thing is to hydrate-hydrate-hydrate.  To begin lifting weights and working out more to keep yourself fit and to challenge your balance.  Please follow-up with your primary care doctor within 10 days.

## 2024-11-30 LAB — BACTERIA SPEC AEROBE CULT: NORMAL

## 2024-12-02 ENCOUNTER — OFFICE VISIT (OUTPATIENT)
Dept: FAMILY MEDICINE CLINIC | Facility: CLINIC | Age: 84
End: 2024-12-02
Payer: MEDICARE

## 2024-12-02 VITALS
WEIGHT: 146 LBS | TEMPERATURE: 98.2 F | HEART RATE: 90 BPM | BODY MASS INDEX: 24.92 KG/M2 | HEIGHT: 64 IN | RESPIRATION RATE: 16 BRPM | DIASTOLIC BLOOD PRESSURE: 74 MMHG | OXYGEN SATURATION: 94 % | SYSTOLIC BLOOD PRESSURE: 132 MMHG

## 2024-12-02 DIAGNOSIS — G43.109 OCULAR MIGRAINE: ICD-10-CM

## 2024-12-02 DIAGNOSIS — N30.90 CYSTITIS: Primary | ICD-10-CM

## 2024-12-02 DIAGNOSIS — M12.811 RIGHT ROTATOR CUFF TEAR ARTHROPATHY: ICD-10-CM

## 2024-12-02 DIAGNOSIS — N95.8 GENITOURINARY SYNDROME OF MENOPAUSE: ICD-10-CM

## 2024-12-02 DIAGNOSIS — I10 PRIMARY HYPERTENSION: ICD-10-CM

## 2024-12-02 DIAGNOSIS — M75.101 RIGHT ROTATOR CUFF TEAR ARTHROPATHY: ICD-10-CM

## 2024-12-02 PROCEDURE — 3078F DIAST BP <80 MM HG: CPT | Performed by: INTERNAL MEDICINE

## 2024-12-02 PROCEDURE — 1111F DSCHRG MED/CURRENT MED MERGE: CPT | Performed by: INTERNAL MEDICINE

## 2024-12-02 PROCEDURE — 99214 OFFICE O/P EST MOD 30 MIN: CPT | Performed by: INTERNAL MEDICINE

## 2024-12-02 PROCEDURE — 3075F SYST BP GE 130 - 139MM HG: CPT | Performed by: INTERNAL MEDICINE

## 2024-12-02 RX ORDER — BISOPROLOL FUMARATE 10 MG/1
10 TABLET, FILM COATED ORAL DAILY
Qty: 90 TABLET | Refills: 3 | Status: SHIPPED | OUTPATIENT
Start: 2024-12-02

## 2024-12-02 NOTE — PROGRESS NOTES
Transitional Care Follow Up Visit  Subjective     Yareli Fowler is a 84 y.o. female who presents for a transitional care management visit.    Within 48 business hours after discharge our office contacted her via telephone to coordinate her care and needs.      I reviewed and discussed the details of that call along with the discharge summary, hospital problems, inpatient lab results, inpatient diagnostic studies, and consultation reports with Yareli.     Current outpatient and discharge medications have been reconciled for the patient.  Reviewed by: Berenice Juarez MD           No data to display              Risk for Readmission (LACE) No data recorded    Urinary Tract Infection     Hypertension    Hyperlipidemia       Course During Hospital Stay: Patient is here today for follow-up visit after an ER visit.  She was recently seen few days ago in the ER due to loss of consciousness and was noted to have UTI.  Currently on Keflex antibiotic therapy.  Patient reports symptoms are improving, denies any fever/chills, urinary frequency or urgency, hematuria or nocturia.    She also reports intermittent vaginal dryness has been going on for months but no discharge or bleeding    Reports chronic right shoulder pain for months due to rotator cuff tear.  Was followed by orthopedic back in [place] that suggested surgical intervention at that time however patient refused.  Now, she would like to consider another evaluation and orthopedic referral.     Patient has also had ocular migraine for years intermittently and will like to follow-up with ophthalmology.    The following portions of the patient's history were reviewed and updated as appropriate: allergies, current medications, past family history, past medical history, past social history, past surgical history, and problem list.    Review of Systems    Objective   /74 (BP Location: Right arm, Patient Position: Sitting, Cuff Size: Adult)   Pulse 90   Temp 98.2  "°F (36.8 °C) (Oral)   Resp 16   Ht 162.6 cm (64.02\")   Wt 66.2 kg (146 lb)   SpO2 94%   BMI 25.05 kg/m²   Physical Exam  Constitutional:       Appearance: Normal appearance.   HENT:      Head: Normocephalic and atraumatic.   Cardiovascular:      Rate and Rhythm: Normal rate and regular rhythm.      Heart sounds: Normal heart sounds.   Pulmonary:      Breath sounds: Normal breath sounds.   Abdominal:      General: Bowel sounds are normal.      Palpations: Abdomen is soft.   Neurological:      Mental Status: She is alert and oriented to person, place, and time.         Assessment & Plan   Diagnoses and all orders for this visit:    1. Cystitis (Primary)  Assessment & Plan:  Clinically improving  To continue and complete antibiotic therapy as prescribed      2. Genitourinary syndrome of menopause  -     Estrogens Conjugated (PREMARIN) 0.625 MG/GM vaginal cream; Insert  into the vagina Daily.  Dispense: 1 each; Refill: 0    3. Primary hypertension  Assessment & Plan:  Hypertension is stable and controlled  Continue current treatment regimen.  Dietary sodium restriction.  Weight loss.  Regular aerobic exercise.  Blood pressure will be reassessed in 6 months.    Orders:  -     bisoprolol (ZEBeta) 10 MG tablet; Take 1 tablet by mouth Daily.  Dispense: 90 tablet; Refill: 3    4. Ocular migraine  -     Ambulatory Referral to Ophthalmology    5. Right rotator cuff tear arthropathy  -     Ambulatory Referral to Orthopedic Surgery                   "

## 2024-12-02 NOTE — PROGRESS NOTES
"  Chief Complaint   Patient presents with    Loss of Consciousness     Pt had a fainting episode on 11-22, pt did go to the ER, said she seen squares, follow up on uti, wants to discuss estrogen and helping with the prevention of uti    Hospital Follow Up Visit    Urinary Tract Infection    Hypertension    Hyperlipidemia      History of Present Illness:      PMH:   Outpatient Medications Prior to Visit   Medication Sig Dispense Refill    aspirin 81 MG EC tablet Take 1 tablet by mouth Daily.      cephalexin (KEFLEX) 500 MG capsule Take 1 capsule by mouth 2 (Two) Times a Day for 7 days. 14 capsule 0    ezetimibe (Zetia) 10 MG tablet Take 1 tablet by mouth Daily. 90 tablet 1    Probiotic Product (Kiveda) capsule capsule Take 1 capsule by mouth Daily.      vitamin D3 125 MCG (5000 UT) capsule capsule Take 1 capsule by mouth Daily.      VITAMIN E 400 UNIT capsule Take 2 capsules by mouth Daily.      bisoprolol (ZEBeta) 10 MG tablet TAKE 1 TABLET BY MOUTH EVERY DAY 30 tablet 0     No facility-administered medications prior to visit.      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    Succinylcholine Other (See Comments)     Paralysis     Past Surgical History:   Procedure Laterality Date    HYSTERECTOMY  1976    TOTAL HIP ARTHROPLASTY REVISION       family history is not on file.   reports that she quit smoking about 63 years ago. Her smoking use included cigarettes. She started smoking about 64 years ago. She has a 0.3 pack-year smoking history. She has been exposed to tobacco smoke. She has never used smokeless tobacco. She reports current alcohol use. She reports that she does not use drugs.     /74 (BP Location: Right arm, Patient Position: Sitting, Cuff Size: Adult)   Pulse 90   Temp 98.2 °F (36.8 °C) (Oral)   Resp 16   Ht 162.6 cm (64.02\")   Wt 66.2 kg (146 lb)   LMP "  (LMP Unknown)   SpO2 94%   BMI 25.05 kg/m²   Physical Exam     {The following data was reviewed by (Optional):88665}  {Ambulatory Labs (Optional):39233}  {Data reviewed (Optional):00037:::1}     Diagnoses and all orders for this visit:    1. Recurrent UTI (Primary)    2. Genitourinary syndrome of menopause  -     Estrogens Conjugated (PREMARIN) 0.625 MG/GM vaginal cream; Insert  into the vagina Daily.  Dispense: 1 each; Refill: 0    3. Primary hypertension  -     bisoprolol (ZEBeta) 10 MG tablet; Take 1 tablet by mouth Daily.  Dispense: 90 tablet; Refill: 3    4. Ocular migraine  -     Ambulatory Referral to Ophthalmology    5. Right rotator cuff tear arthropathy  -     Ambulatory Referral to Orthopedic Surgery             No follow-ups on file.

## 2025-05-29 ENCOUNTER — OFFICE VISIT (OUTPATIENT)
Dept: FAMILY MEDICINE CLINIC | Facility: CLINIC | Age: 85
End: 2025-05-29
Payer: MEDICARE

## 2025-05-29 VITALS
DIASTOLIC BLOOD PRESSURE: 68 MMHG | RESPIRATION RATE: 18 BRPM | OXYGEN SATURATION: 96 % | SYSTOLIC BLOOD PRESSURE: 110 MMHG | HEART RATE: 64 BPM | WEIGHT: 149 LBS | BODY MASS INDEX: 25.44 KG/M2 | HEIGHT: 64 IN

## 2025-05-29 DIAGNOSIS — M19.90 ARTHRITIS: Primary | ICD-10-CM

## 2025-05-29 DIAGNOSIS — H93.8X2 EAR FULLNESS, LEFT: ICD-10-CM

## 2025-05-29 RX ORDER — TRIAMCINOLONE ACETONIDE 1 MG/G
CREAM TOPICAL
COMMUNITY
Start: 2025-02-26

## 2025-05-29 RX ORDER — MELOXICAM 15 MG/1
15 TABLET ORAL DAILY
Qty: 30 TABLET | Refills: 3 | Status: SHIPPED | OUTPATIENT
Start: 2025-05-29

## 2025-05-29 NOTE — PROGRESS NOTES
Chief Complaint  Chief Complaint   Patient presents with    [facility]     New Pt     Arthritis     Pt c/o ongoing pain and would like to stronger medication if possible     Ear Fullness     Pt c/o L ear fullness on/off for years        Subjective    History of Present Illness        Yareli Fowler presents to [facility] for   History of Present Illness  The patient presents for evaluation of arthritis, left ear fullness, and blood loss.    She reports experiencing widespread arthritis, which she manages with as-needed extra strength Tylenol. She avoids ibuprofen due to a family history of stroke in her mother and brother. She has been using a compounded medication containing lidocaine for her shoulder, which also provides relief for her knees. Physical therapy for her shoulder has improved her condition and postponed the need for surgery. She has a history of left hip implant surgery, which was complicated by significant bleeding, leading to a hemoglobin drop from 13 to 5. She expresses reluctance towards further surgery due to this experience. Her knees are currently causing discomfort, which she believes may be weather-related. She consulted with Dr. Flynn regarding knee injections but was advised against it. She reports difficulty in standing up from a seated position but can walk well once standing. Pain is present with movement but not when stationary. She has a history of scoliosis, diagnosed during physical therapy, but does not believe it is causing her current symptoms.    She reports issues with her left ear, including a sensation of fullness and occasional blockage. She has not had her hearing checked recently and does not currently use hearing aids. She suspects the issue may be due to wax buildup and uses the Valsalva maneuver to temporarily relieve the blockage.    She has a history of skipped or extra heartbeats, for which she was prescribed bisoprolol while living  "in [place]. She believes she experienced similar symptoms recently.    She has a history of bladder leakage, which she manages with a plant-based product that has been effective.    She has not taken cholesterol medication for some time and believes her body has adjusted to this. She does not attempt to control her cholesterol through diet as previous attempts have been unsuccessful.    PAST SURGICAL HISTORY:  - Left hip implant surgery complicated by significant bleeding  - Reverse shoulder implant postponed due to physical therapy    FAMILY HISTORY  The patient has a family history of stroke in her mother and brother.  Arthritis    Ear Fullness     History of Present Illness      Objective   Vital Signs:   Visit Vitals  /68   Pulse 64   Resp 18   Ht 162.6 cm (64.02\")   Wt 67.6 kg (149 lb)   LMP  (LMP Unknown)   SpO2 96%   BMI 25.56 kg/m²          BMI is >= 25 and <30. (Overweight) The following options were offered after discussion;: exercise counseling/recommendations and nutrition counseling/recommendations     Physical Exam  Vitals reviewed.   Constitutional:       Appearance: She is well-developed.   HENT:      Head: Normocephalic.      Right Ear: External ear normal.      Left Ear: External ear normal.      Nose: Nose normal.   Eyes:      Conjunctiva/sclera: Conjunctivae normal.   Cardiovascular:      Rate and Rhythm: Normal rate and regular rhythm.   Pulmonary:      Effort: Pulmonary effort is normal.      Breath sounds: Normal breath sounds.   Musculoskeletal:         General: Normal range of motion.      Cervical back: Normal range of motion and neck supple.   Skin:     General: Skin is warm and dry.      Capillary Refill: Capillary refill takes less than 2 seconds.   Neurological:      Mental Status: She is alert and oriented to person, place, and time.        Physical Exam  Ears: External ear canals and tympanic membranes intact  Respiratory: Clear to auscultation, no wheezing, rales or " rhonchi  Cardiovascular: Regular rate and rhythm, no murmurs, rubs, or gallops  Musculoskeletal: Tenderness noted in the shoulder and knee joints         Result Review :  Results                            Assessment and Plan      Diagnoses and all orders for this visit:    1. Arthritis (Primary)  Assessment & Plan:  - Reports experiencing widespread arthritis, managed with as-needed extra strength Tylenol.  - Avoids ibuprofen due to a family history of stroke in her mother and brother.  - Using a compounded medication containing lidocaine for her shoulder, which also provides relief for her knees. Underwent physical therapy for her shoulder, improving her condition and postponing surgery.  - Prescription for Mobic (meloxicam) will be provided, to be taken once daily. May continue using the cream and Tylenol as needed.    Orders:  -     meloxicam (MOBIC) 15 MG tablet; Take 1 tablet by mouth Daily.  Dispense: 30 tablet; Refill: 3    2. Ear fullness, left  Assessment & Plan:  - Reports issues with her left ear, including a sensation of fullness and occasional blockage. Has not had her hearing checked recently and does not currently use hearing aids.  - Suspects the issue may be due to wax buildup. Uses the Valsalva maneuver to temporarily relieve the blockage.  - Examination revealed no cerumen impaction in the ear canal, but fluid was noted behind the tympanic membrane.  - Advised to take Benadryl at night before bed to help clear the fluid.         Assessment & Plan             Follow Up   No follow-ups on file.  Patient was given instructions and counseling regarding her condition or for health maintenance advice. Please see specific information pulled into the AVS if appropriate.     Patient or patient representative verbalized consent for the use of Ambient Listening during the visit with  Warren Adames Sr, MD for chart documentation. 6/8/2025  15:38 EDT

## 2025-06-08 PROBLEM — L85.3 DRY SKIN: Status: ACTIVE | Noted: 2022-11-16

## 2025-06-08 PROBLEM — M21.619 BUNION: Status: ACTIVE | Noted: 2021-04-14

## 2025-06-08 PROBLEM — H93.8X2 EAR FULLNESS, LEFT: Status: ACTIVE | Noted: 2025-06-08

## 2025-06-08 PROBLEM — R51.9 HEADACHE: Status: ACTIVE | Noted: 2021-10-25

## 2025-06-08 PROBLEM — B35.1 ONYCHOMYCOSIS: Status: ACTIVE | Noted: 2022-11-16

## 2025-06-08 PROBLEM — G56.20 ULNAR NERVE ENTRAPMENT AT ELBOW: Status: ACTIVE | Noted: 2021-12-09

## 2025-06-08 PROBLEM — M41.9 SCOLIOSIS OF LUMBAR SPINE: Status: ACTIVE | Noted: 2021-04-14

## 2025-06-08 PROBLEM — R61 GENERALIZED HYPERHIDROSIS: Status: ACTIVE | Noted: 2022-05-25

## 2025-06-08 PROBLEM — M25.519 SHOULDER PAIN: Status: ACTIVE | Noted: 2022-05-25

## 2025-06-08 PROBLEM — L03.119 CELLULITIS OF LOWER LEG: Status: RESOLVED | Noted: 2021-06-03 | Resolved: 2025-06-08

## 2025-06-08 PROBLEM — M13.849: Status: ACTIVE | Noted: 2022-05-25

## 2025-06-08 PROBLEM — M43.12 ACQUIRED SPONDYLOLISTHESIS OF CERVICAL VERTEBRA: Status: ACTIVE | Noted: 2021-10-25

## 2025-06-08 PROBLEM — M48.02 CERVICAL STENOSIS OF SPINAL CANAL: Status: ACTIVE | Noted: 2021-10-25

## 2025-06-08 PROBLEM — T14.8XXA OPEN WOUND OF SKIN: Status: ACTIVE | Noted: 2021-06-30

## 2025-06-08 PROBLEM — M43.10 SPONDYLOLISTHESIS: Status: ACTIVE | Noted: 2021-06-30

## 2025-06-08 PROBLEM — M21.629 TAILOR'S BUNION: Status: ACTIVE | Noted: 2021-04-14

## 2025-06-08 PROBLEM — M54.2 NECK PAIN: Status: ACTIVE | Noted: 2021-06-02

## 2025-06-08 PROBLEM — R76.8 ANTINUCLEAR ANTIBODY (ANA) POSITIVE: Status: ACTIVE | Noted: 2021-06-30

## 2025-06-08 PROBLEM — R53.81 MALAISE AND FATIGUE: Status: ACTIVE | Noted: 2021-06-02

## 2025-06-08 PROBLEM — R25.2 CRAMPS OF LOWER EXTREMITY: Status: ACTIVE | Noted: 2021-06-02

## 2025-06-08 PROBLEM — M75.40 SUBACROMIAL IMPINGEMENT: Status: ACTIVE | Noted: 2022-05-25

## 2025-06-08 PROBLEM — M47.812 CERVICAL SPONDYLOSIS: Status: ACTIVE | Noted: 2021-10-25

## 2025-06-08 PROBLEM — R53.83 MALAISE AND FATIGUE: Status: ACTIVE | Noted: 2021-06-02

## 2025-06-08 PROBLEM — G56.03 BILATERAL CARPAL TUNNEL SYNDROME: Status: ACTIVE | Noted: 2021-11-01

## 2025-06-08 PROBLEM — M75.120 FULL THICKNESS ROTATOR CUFF TEAR: Status: ACTIVE | Noted: 2022-06-30

## 2025-06-08 PROBLEM — M54.16 LUMBAR RADICULOPATHY: Status: ACTIVE | Noted: 2021-06-02

## 2025-06-08 PROBLEM — Z79.01 LONG TERM (CURRENT) USE OF ANTICOAGULANTS: Status: ACTIVE | Noted: 2023-05-24

## 2025-06-08 PROBLEM — S81.809A OPEN WOUND OF LOWER LEG: Status: ACTIVE | Noted: 2021-06-03

## 2025-06-08 PROBLEM — M25.50 ARTHRALGIA OF MULTIPLE JOINTS: Status: ACTIVE | Noted: 2021-06-02

## 2025-06-08 PROBLEM — M54.12 CERVICAL RADICULOPATHY: Status: ACTIVE | Noted: 2021-09-30

## 2025-06-08 PROBLEM — M47.816 LUMBAR SPONDYLOSIS: Status: ACTIVE | Noted: 2021-06-02

## 2025-06-08 PROBLEM — R29.898 WEAKNESS OF BOTH HANDS: Status: ACTIVE | Noted: 2021-10-25

## 2025-06-08 PROBLEM — M17.9 OSTEOARTHRITIS OF KNEE: Status: ACTIVE | Noted: 2022-06-30

## 2025-06-08 NOTE — ASSESSMENT & PLAN NOTE
- Reports experiencing widespread arthritis, managed with as-needed extra strength Tylenol.  - Avoids ibuprofen due to a family history of stroke in her mother and brother.  - Using a compounded medication containing lidocaine for her shoulder, which also provides relief for her knees. Underwent physical therapy for her shoulder, improving her condition and postponing surgery.  - Prescription for Mobic (meloxicam) will be provided, to be taken once daily. May continue using the cream and Tylenol as needed.

## 2025-06-08 NOTE — ASSESSMENT & PLAN NOTE
- Reports issues with her left ear, including a sensation of fullness and occasional blockage. Has not had her hearing checked recently and does not currently use hearing aids.  - Suspects the issue may be due to wax buildup. Uses the Valsalva maneuver to temporarily relieve the blockage.  - Examination revealed no cerumen impaction in the ear canal, but fluid was noted behind the tympanic membrane.  - Advised to take Benadryl at night before bed to help clear the fluid.

## 2025-06-23 ENCOUNTER — TELEPHONE (OUTPATIENT)
Dept: FAMILY MEDICINE CLINIC | Facility: CLINIC | Age: 85
End: 2025-06-23
Payer: MEDICARE

## 2025-06-23 RX ORDER — AMOXICILLIN 500 MG/1
1000 CAPSULE ORAL 3 TIMES DAILY
Qty: 42 CAPSULE | Refills: 0 | Status: SHIPPED | OUTPATIENT
Start: 2025-06-23 | End: 2025-06-30

## 2025-06-23 NOTE — TELEPHONE ENCOUNTER
Caller: Yareli Fowler    Relationship: Self    Best call back number: [phone]     What medication are you requestin PROPHYLACTIC PENICILLIN FOR DENTAL PROCEDURE ON 2025    What are your current symptoms: PATIENT HAS A HIP IMPLANT AND HAS TO HAVE ANTIBIOTICS WHEN SHE HAS DENTAL PROCEDURES.  HER ORTHOPEDIC DOCTOR USUALLY CALLS THIS IN BUT HE IS IN [place].  ASKING IF DR. REYNOSO WILL CALL THAT IN.        Have you had these symptoms before:    [x] Yes  [] No    Have you been treated for these symptoms before:   [x] Yes  [] No    If a prescription is needed, what is your preferred pharmacy and phone number: [id number] [place], [place] - [address] [facility]. AT [facility] [phone] [facility] [phone] FX     Additional notes:    PLEASE CALL PATIENT WHEN THESE ARE CALLED IN.